# Patient Record
Sex: FEMALE | Race: WHITE | NOT HISPANIC OR LATINO | URBAN - METROPOLITAN AREA
[De-identification: names, ages, dates, MRNs, and addresses within clinical notes are randomized per-mention and may not be internally consistent; named-entity substitution may affect disease eponyms.]

---

## 2019-08-21 ENCOUNTER — OFFICE VISIT (OUTPATIENT)
Dept: URGENT CARE | Facility: MEDICAL CENTER | Age: 19
End: 2019-08-21
Payer: MEDICARE

## 2019-08-21 VITALS
OXYGEN SATURATION: 98 % | HEART RATE: 83 BPM | SYSTOLIC BLOOD PRESSURE: 110 MMHG | TEMPERATURE: 98.2 F | DIASTOLIC BLOOD PRESSURE: 76 MMHG | HEIGHT: 68 IN | RESPIRATION RATE: 18 BRPM | WEIGHT: 229 LBS | BODY MASS INDEX: 34.71 KG/M2

## 2019-08-21 DIAGNOSIS — R10.30 LOWER ABDOMINAL PAIN: Primary | ICD-10-CM

## 2019-08-21 LAB
SL AMB  POCT GLUCOSE, UA: ABNORMAL
SL AMB LEUKOCYTE ESTERASE,UA: ABNORMAL
SL AMB POCT BILIRUBIN,UA: ABNORMAL
SL AMB POCT BLOOD,UA: ABNORMAL
SL AMB POCT CLARITY,UA: ABNORMAL
SL AMB POCT COLOR,UA: YELLOW
SL AMB POCT KETONES,UA: 1.02
SL AMB POCT NITRITE,UA: ABNORMAL
SL AMB POCT PH,UA: 6
SL AMB POCT SPECIFIC GRAVITY,UA: 1
SL AMB POCT URINE HCG: NORMAL
SL AMB POCT URINE PROTEIN: ABNORMAL
SL AMB POCT UROBILINOGEN: 0.2

## 2019-08-21 PROCEDURE — 99213 OFFICE O/P EST LOW 20 MIN: CPT | Performed by: PHYSICIAN ASSISTANT

## 2019-08-21 PROCEDURE — 81002 URINALYSIS NONAUTO W/O SCOPE: CPT | Performed by: PHYSICIAN ASSISTANT

## 2019-08-21 PROCEDURE — 81025 URINE PREGNANCY TEST: CPT | Performed by: PHYSICIAN ASSISTANT

## 2019-08-21 RX ORDER — PROPRANOLOL HYDROCHLORIDE 80 MG/1
CAPSULE, EXTENDED RELEASE ORAL
COMMUNITY
Start: 2019-08-19 | End: 2019-09-20 | Stop reason: SDUPTHER

## 2019-08-21 RX ORDER — VENLAFAXINE HYDROCHLORIDE 75 MG/1
CAPSULE, EXTENDED RELEASE ORAL
COMMUNITY
Start: 2019-08-11

## 2019-08-21 NOTE — LETTER
August 21, 2019     Patient: April Torres   YOB: 2000   Date of Visit: 8/21/2019       To Whom it May Concern:    April Torres was seen in my clinic on 8/21/2019  She may be excused on 8/21/19 from work  If you have any questions or concerns, please don't hesitate to call           Sincerely,          Malik Combs PA-C        CC: No Recipients

## 2019-08-22 NOTE — PROGRESS NOTES
[facility]        NAME: Noe Erwin is a 19 y o  female  : 2000    MRN: [id number]  DATE: 2019  TIME: 8:44 PM    Assessment and Plan   Lower abdominal pain [R10 30]  1  Lower abdominal pain  POCT urine dip    POCT urine HCG    Urine culture      Patient currently on menstrual cycle and pain is described as crampy and in lower pelvic area  Likely dysmenorrhea, patient also has menorrhagia  Recommended follow-up with gyn if symptoms do not improve  Recommended NSAIDs for pain as well as heating pad  Pregnancy test negative, urine only showed small amount of leukocytes  Will send for culture to rule out a UTI as well  Patient Instructions   Please use Aleve or Motrin for lower abdominal cramping   may use heating pad on lower abdomen   follow-up with gyn if symptoms do not improve  Proceed to ER if symptoms worsen      Chief Complaint     Chief Complaint   Patient presents with    Abdominal Pain     Lower abdominal pain described as a constant stabbing pain beginning last evening  Left sided rib pain today   Rib Pain         History of Present Illness       Patient is a 18 y/o female who presents today with complaints of lower abdominal pain  This started last night and is sharp and crampy in nature  She has had her menstrual cycle for the past week and does have issues with dysmenorrhea  She does report nausea but denies any vomiting or diarrhea  She denies fevers  She has not really tried OTC NSAIDs as she feels these never worked for her in the past, she has tried CBD oil  Of note patient recently got Nexplanon put in back in 2019, a periods have been irregular since then  She also reports menorrhagia  She denies any frequency, urgency, dysuria  Review of Systems   Review of Systems   Constitutional: Negative for fever  Respiratory: Negative for shortness of breath  Cardiovascular: Negative for chest pain     Gastrointestinal: Positive for abdominal pain and nausea  Negative for diarrhea and vomiting  Genitourinary: Positive for pelvic pain  Negative for dysuria, frequency and urgency  Musculoskeletal: Positive for back pain  Current Medications       Current Outpatient Medications:     propranolol (INDERAL LA) 80 mg 24 hr capsule, , Disp: , Rfl:     venlafaxine (EFFEXOR-XR) 75 mg 24 hr capsule, , Disp: , Rfl:     Current Allergies     Allergies as of 08/21/2019    (No Known Allergies)            The following portions of the patient's history were reviewed and updated as appropriate: allergies, current medications, past family history, past medical history, past social history, past surgical history and problem list      Past Medical History:   Diagnosis Date    Anxiety     Depression        History reviewed  No pertinent surgical history  Family History   Problem Relation Age of Onset    Hypertension Mother     Hyperlipidemia Mother     Asthma Father     Hypertension Father          Medications have been verified  Objective   /76   Pulse 83   Temp 98 2 °F (36 8 °C) (Temporal)   Resp 18   Ht 5' 8" (1 727 m)   Wt 104 kg (229 lb)   LMP 08/15/2019 (Approximate)   SpO2 98%   BMI 34 82 kg/m²        Physical Exam     Physical Exam   Constitutional: She appears well-developed and well-nourished  No distress  Cardiovascular: Normal rate and regular rhythm  Pulmonary/Chest: Effort normal and breath sounds normal    Abdominal: Soft  Normal appearance and bowel sounds are normal  She exhibits no distension  There is tenderness in the suprapubic area  No RLQ pain or tenderness   Skin: Skin is warm and dry

## 2019-08-22 NOTE — PATIENT INSTRUCTIONS
Please use Aleve or Motrin for lower abdominal cramping   may use heating pad on lower abdomen   follow-up with gyn if symptoms do not improve  Proceed to ER if symptoms worsen    Dysmenorrhea   WHAT YOU NEED TO KNOW:   Dysmenorrhea is painful menstrual cramps at or around the time of your monthly period  DISCHARGE INSTRUCTIONS:   Medicines: You may need any of the following:  · NSAIDs  help decrease swelling, pain, and fever  This medicine is available with or without a doctor's order  NSAIDs can cause stomach bleeding or kidney problems in certain people  If you take blood thinner medicine, always ask your healthcare provider if NSAIDs are safe for you  Always read the medicine label and follow directions  · Birth control medicine  may help decrease your pain  This medicine may be birth control pills or an IUD that does not contain copper  · Take your medicine as directed  Contact your healthcare provider if you think your medicine is not helping or if you have side effects  Tell him if you are allergic to any medicine  Keep a list of the medicines, vitamins, and herbs you take  Include the amounts, and when and why you take them  Bring the list or the pill bottles to follow-up visits  Carry your medicine list with you in case of an emergency  Eat low-fat foods: Increase the amount of vegetables and raw seeds you eat  Ask your healthcare provider if you should take vitamin B or magnesium supplements  These will help decrease your pain  Do not eat dairy products or eggs  Apply heat:  Apply heat on your lower abdomen for 20 to 30 minutes every 2 hours for as many days as directed  Heat helps decrease pain and muscle spasms  Manage your stress:  Stress can make your symptoms worse  Try relaxation exercises, such as deep breathing  Exercise regularly:  Ask your healthcare provider about the best exercise plan for you  Exercise can help decrease pain     Keep a record of your pain:  Write down when your pain and periods start and stop  Bring the record with you to your follow-up visits  Do not smoke:  Avoid others who smoke  If you smoke, it is never too late to quit  Smoking can increase your risk for dysmenorrhea  Ask your healthcare provider for information if you need help quitting  Follow up with your healthcare provider or OB-GYN as directed:  Write down your questions so you remember to ask them during your visits  Contact your healthcare provider or OB-GYN if:   · You have anxiety or feel depressed  · Your periods are early, late, or more painful than usual     · You have questions or concerns about your condition or care  Return to the emergency department if:   · You have severe pain  · You have heavy vaginal bleeding and you feel faint  · You have sudden chest pain and trouble breathing  © 2017 [address] Information is for End User's use only and may not be sold, redistributed or otherwise used for commercial purposes  All illustrations and images included in CareNotes® are the copyrighted property of A D A M [employer] Inc  or [employer]  The above information is an  only  It is not intended as medical advice for individual conditions or treatments  Talk to your doctor, nurse or pharmacist before following any medical regimen to see if it is safe and effective for you

## 2019-08-24 LAB
BACTERIA UR CULT: NORMAL
Lab: NO GROWTH

## 2019-09-20 ENCOUNTER — OFFICE VISIT (OUTPATIENT)
Dept: FAMILY MEDICINE CLINIC | Facility: MEDICAL CENTER | Age: 19
End: 2019-09-20
Payer: MEDICARE

## 2019-09-20 VITALS
DIASTOLIC BLOOD PRESSURE: 84 MMHG | SYSTOLIC BLOOD PRESSURE: 138 MMHG | WEIGHT: 233 LBS | HEART RATE: 86 BPM | BODY MASS INDEX: 35.31 KG/M2 | HEIGHT: 68 IN | RESPIRATION RATE: 16 BRPM

## 2019-09-20 DIAGNOSIS — Z23 ENCOUNTER FOR IMMUNIZATION: ICD-10-CM

## 2019-09-20 DIAGNOSIS — F32.A DEPRESSION, UNSPECIFIED DEPRESSION TYPE: ICD-10-CM

## 2019-09-20 DIAGNOSIS — Z13.1 SCREENING FOR DIABETES MELLITUS: ICD-10-CM

## 2019-09-20 DIAGNOSIS — G43.909 MIGRAINE WITHOUT STATUS MIGRAINOSUS, NOT INTRACTABLE, UNSPECIFIED MIGRAINE TYPE: Primary | ICD-10-CM

## 2019-09-20 DIAGNOSIS — Z13.220 SCREENING FOR LIPID DISORDERS: ICD-10-CM

## 2019-09-20 DIAGNOSIS — M51.26 DISC DISPLACEMENT, LUMBAR: ICD-10-CM

## 2019-09-20 DIAGNOSIS — Z12.4 SCREENING FOR CERVICAL CANCER: ICD-10-CM

## 2019-09-20 DIAGNOSIS — Z13.29 SCREENING FOR THYROID DISORDER: ICD-10-CM

## 2019-09-20 DIAGNOSIS — J45.990 EXERCISE-INDUCED BRONCHOSPASM: ICD-10-CM

## 2019-09-20 DIAGNOSIS — F41.9 ANXIETY: ICD-10-CM

## 2019-09-20 PROBLEM — N94.6 DYSMENORRHEA: Status: ACTIVE | Noted: 2018-10-09

## 2019-09-20 PROCEDURE — 90471 IMMUNIZATION ADMIN: CPT

## 2019-09-20 PROCEDURE — 3008F BODY MASS INDEX DOCD: CPT | Performed by: FAMILY MEDICINE

## 2019-09-20 PROCEDURE — 99204 OFFICE O/P NEW MOD 45 MIN: CPT | Performed by: FAMILY MEDICINE

## 2019-09-20 PROCEDURE — 90651 9VHPV VACCINE 2/3 DOSE IM: CPT

## 2019-09-20 RX ORDER — PROPRANOLOL HYDROCHLORIDE 80 MG/1
80 CAPSULE, EXTENDED RELEASE ORAL DAILY
Qty: 90 CAPSULE | Refills: 1 | Status: SHIPPED | OUTPATIENT
Start: 2019-09-20

## 2019-09-20 RX ORDER — ALBUTEROL SULFATE 90 UG/1
AEROSOL, METERED RESPIRATORY (INHALATION)
COMMUNITY
Start: 2019-04-24

## 2019-09-20 RX ORDER — SUMATRIPTAN 100 MG/1
TABLET, FILM COATED ORAL
COMMUNITY
Start: 2019-04-10 | End: 2019-09-24 | Stop reason: SDUPTHER

## 2019-09-20 RX ORDER — KETOROLAC TROMETHAMINE 10 MG/1
TABLET, FILM COATED ORAL
COMMUNITY
Start: 2019-08-23 | End: 2019-09-20

## 2019-09-20 RX ORDER — ONDANSETRON 4 MG/1
TABLET, FILM COATED ORAL
COMMUNITY
Start: 2019-08-23

## 2019-09-20 NOTE — PROGRESS NOTES
Assessment/Plan:    No problem-specific Assessment & Plan notes found for this encounter  Diagnoses and all orders for this visit:    Migraine without status migrainosus, not intractable, unspecified migraine type  -     propranolol (INDERAL LA) 80 mg 24 hr capsule; Take 1 capsule (80 mg total) by mouth daily  Stable  Continue propranolol daily  Continue as needed Imitrex and Zofran  Depression, unspecified depression type  -     Ambulatory referral to [facility]; Future  Appears to be stable  No suicidal ideation  Continue Effexor  Anxiety  -     Ambulatory referral to [facility]; Future  Appears to be stable  Continue Effexor  Avoid benzodiazepines at this time  Exercise-induced bronchospasm  Well controlled  Continue albuterol as needed  Disc displacement, lumbar  Likely secondary to obesity  Diet and exercise highly encouraged  Note provided for patient to be able to wear sneakers at work  Hold off on pain management referral at this time  BMI Counseling: Body mass index is 35 43 kg/m²  The BMI is above normal  Nutrition recommendations include decreasing overall calorie intake  Exercise recommendations include moderate aerobic physical activity for 150 minutes/week  Encounter for immunization  -     HPV VACCINE 9 VALENT IM  Patient is over due for her HPV vaccine  She is agreeable to the vaccine  Vaccine given and tolerated well  Flu vaccine recommended as well but patient declined  Screening for cervical cancer  -     Ambulatory referral to Gynecology; Future  Referral to gyn for cervical cancer screening and due to patient having a Nexplanon  Screening for lipid disorders  -     Lipid Panel with Direct LDL reflex; Future  -     Lipid Panel with Direct LDL reflex  Screening for diabetes mellitus  -     Comprehensive metabolic panel;  Future  -     Hemoglobin A1C; Future  -     Comprehensive metabolic panel  -     Hemoglobin A1C  Screening for thyroid disorder  -     TSH, 3rd generation with Free T4 reflex; Future  -     TSH, 3rd generation with Free T4 reflex  Check labs to screen for lipid disorders, diabetes and thyroid disorder  Other orders  -     ondansetron (ZOFRAN) 4 mg tablet; take 1 tablet by mouth every 8 hours if needed for nausea  -     Discontinue: ketorolac (TORADOL) 10 mg tablet; take 1 tablet by mouth every 6 hours if needed for pain  -     etonogestrel (NEXPLANON) subdermal implant; To be placed at our office   -     albuterol (VENTOLIN HFA) 90 mcg/act inhaler; 1-2 puffs q6 hrs PRN  -     SUMAtriptan (IMITREX) 100 mg tablet; TAKE ONE TABLET BY MOUTH ONCE DAILY AS NEEDED MAY  REPEAT IN 2  HOURS  X1    Follow-up in six months for physical exam or sooner if needed  Subjective:      Patient ID: Arianne San is a 22 y o  female  Patient presents to [facility] care  She recently moved to the area from [place]  She has migraines  She is currently on propranolol 80 mg daily for prophylaxis  Tolerating medication well without any lightheadedness or dizziness  Does need medication to be refilled  She also uses Zofran as needed for nausea when she gets the migraines and Imitrex as needed when she feels a migraine starting  Both medications work very well but a refill is not requested at this time  She has depression  She is currently on Effexor 75 mg daily  Does have some stress recently secondary to her move which has exacerbate her depression somewhat but is still well controlled  Denies suicidal ideation  Does not need a refill at this time  She has anxiety  She is currently on Effexor 75 mg daily for this as well  Stress from recent move has exacerbate her anxiety but she is controlling it well  She was on lorazepam as well on a p r n  basis but has not used that in a few months  She has exercise-induced bronchospasm  She uses an albuterol inhaler prior to physical activity    Medication works very well to keep her symptoms controlled  She would like a work note so she may wear sneakers at work as she has spinal stenosis and not wearing supportive shoes is very painful on her back  She was supposed to see pain management in [place] but never got the appointment due to moving  She is managing her back pain well currently  The following portions of the patient's history were reviewed and updated as appropriate:   She  has a past medical history of Anxiety, Depression, Exercise induced bronchospasm, and Migraines  She   Patient Active Problem List    Diagnosis Date Noted    Exercise-induced bronchospasm 09/20/2019    Anxiety 09/20/2019    Depression 08/05/2019    Disc displacement, lumbar 08/05/2019    Dysmenorrhea 10/09/2018    Dyspepsia 11/12/2015    Migraine headache 10/29/2009     She  has a past surgical history that includes Mouth surgery  Her family history includes Asthma in her father; Hyperlipidemia in her mother; Hypertension in her father and mother  She  reports that she has never smoked  She has never used smokeless tobacco  She reports that she drinks alcohol  She reports that she does not use drugs  Current Outpatient Medications   Medication Sig Dispense Refill    albuterol (VENTOLIN HFA) 90 mcg/act inhaler 1-2 puffs q6 hrs PRN      etonogestrel (NEXPLANON) subdermal implant To be placed at our office   ondansetron (ZOFRAN) 4 mg tablet take 1 tablet by mouth every 8 hours if needed for nausea      propranolol (INDERAL LA) 80 mg 24 hr capsule Take 1 capsule (80 mg total) by mouth daily 90 capsule 1    SUMAtriptan (IMITREX) 100 mg tablet TAKE ONE TABLET BY MOUTH ONCE DAILY AS NEEDED MAY  REPEAT IN 2  HOURS  X1      venlafaxine (EFFEXOR-XR) 75 mg 24 hr capsule        No current facility-administered medications for this visit        Current Outpatient Medications on File Prior to Visit   Medication Sig    albuterol (VENTOLIN HFA) 90 mcg/act inhaler 1-2 puffs q6 hrs PRN    etonogestrel (Joyce Chu) subdermal implant To be placed at our office   ondansetron (ZOFRAN) 4 mg tablet take 1 tablet by mouth every 8 hours if needed for nausea    SUMAtriptan (IMITREX) 100 mg tablet TAKE ONE TABLET BY MOUTH ONCE DAILY AS NEEDED MAY  REPEAT IN 2  HOURS  X1    venlafaxine (EFFEXOR-XR) 75 mg 24 hr capsule     [DISCONTINUED] ketorolac (TORADOL) 10 mg tablet take 1 tablet by mouth every 6 hours if needed for pain    [DISCONTINUED] propranolol (INDERAL LA) 80 mg 24 hr capsule      No current facility-administered medications on file prior to visit  She has No Known Allergies       Review of Systems   Constitutional: Negative for fever  HENT: Negative for ear pain, rhinorrhea and sore throat  Eyes: Negative for visual disturbance  Respiratory: Negative for shortness of breath  Cardiovascular: Negative for chest pain  Gastrointestinal: Negative for abdominal pain and blood in stool  Genitourinary: Negative for dysuria and hematuria  Musculoskeletal: Negative for arthralgias and myalgias  Skin: Negative for rash  Neurological: Negative for headaches  Objective:      /84 (BP Location: Left arm, Patient Position: Sitting, Cuff Size: Large)   Pulse 86   Resp 16   Ht 5' 8" (1 727 m)   Wt 106 kg (233 lb)   BMI 35 43 kg/m²          Physical Exam   Constitutional: She is oriented to person, place, and time  Vital signs are normal  She appears well-developed and well-nourished  HENT:   Head: Normocephalic and atraumatic  Right Ear: Tympanic membrane, external ear and ear canal normal    Left Ear: Tympanic membrane, external ear and ear canal normal    Nose: Nose normal    Mouth/Throat: Uvula is midline, oropharynx is clear and moist and mucous membranes are normal    Eyes: Pupils are equal, round, and reactive to light  Conjunctivae, EOM and lids are normal    Neck: Trachea normal  Neck supple  No thyromegaly present     Cardiovascular: Normal rate, regular rhythm, S1 normal and S2 normal    No murmur heard  Pulmonary/Chest: Effort normal and breath sounds normal    Abdominal: Soft  Bowel sounds are normal  There is no tenderness  Lymphadenopathy:     She has no cervical adenopathy  Neurological: She is alert and oriented to person, place, and time  No cranial nerve deficit  Skin: Skin is warm, dry and intact  Psychiatric: She has a normal mood and affect   Her speech is normal and behavior is normal  Thought content normal

## 2019-09-20 NOTE — LETTER
September 20, 2019     Patient: Eleni Scott   YOB: 2000   Date of Visit: 9/20/2019       To Whom it May Concern:    Eleni Scott is under my professional care  She was seen in my office on 9/20/2019  She is to be allowed to wear sneakers at work  If you have any questions or concerns, please don't hesitate to call           Sincerely,          Bernardino Lucero, DO

## 2019-09-23 ENCOUNTER — OFFICE VISIT (OUTPATIENT)
Dept: URGENT CARE | Facility: MEDICAL CENTER | Age: 19
End: 2019-09-23
Payer: MEDICARE

## 2019-09-23 VITALS
HEIGHT: 68 IN | DIASTOLIC BLOOD PRESSURE: 78 MMHG | BODY MASS INDEX: 35.46 KG/M2 | SYSTOLIC BLOOD PRESSURE: 110 MMHG | RESPIRATION RATE: 18 BRPM | OXYGEN SATURATION: 99 % | HEART RATE: 88 BPM | WEIGHT: 234 LBS | TEMPERATURE: 98.2 F

## 2019-09-23 DIAGNOSIS — G43.909 MIGRAINE WITHOUT STATUS MIGRAINOSUS, NOT INTRACTABLE, UNSPECIFIED MIGRAINE TYPE: Primary | ICD-10-CM

## 2019-09-23 PROCEDURE — 99213 OFFICE O/P EST LOW 20 MIN: CPT | Performed by: PHYSICIAN ASSISTANT

## 2019-09-23 RX ORDER — BUTALBITAL, ACETAMINOPHEN AND CAFFEINE 50; 325; 40 MG/1; MG/1; MG/1
1 TABLET ORAL EVERY 4 HOURS PRN
Qty: 10 TABLET | Refills: 0 | Status: SHIPPED | OUTPATIENT
Start: 2019-09-23

## 2019-09-23 NOTE — LETTER
September 23, 2019     Patient: Alonzo Rosado   YOB: 2000   Date of Visit: 9/23/2019       To Whom it May Concern:    Alonzo Rosado was seen in my clinic on 9/23/2019  She may be excused from work today  If you have any questions or concerns, please don't hesitate to call           Sincerely,          Roya Johnston PA-C        CC: No Recipients

## 2019-09-23 NOTE — PROGRESS NOTES
[facility]        NAME: Cleo Valdes is a 22 y o  female  : 2000    MRN: [id number]  DATE: 2019  TIME: 6:13 PM    Assessment and Plan   Migraine without status migrainosus, not intractable, unspecified migraine type [G43 909]  1  Migraine without status migrainosus, not intractable, unspecified migraine type  butalbital-acetaminophen-caffeine (FIORICET,ESGIC) -40 mg per tablet       Neuro exam within normal limits  No abnormal findings on exam   Will give Fioricet for relief  Recommended following up with PCP for refill of Imitrex  Also deferred to PCP for further management of migraines  Patient Instructions       Please take Fioricet as needed   call PCP and ask for refill of Imitrex  Continue propranolol   return to PCP if symptoms worsen or persist      Chief Complaint     Chief Complaint   Patient presents with    Migraine     c/o migraine and nausea x 3 days  History of Present Illness        Patient is a 25-year-old female who presents today with migraine headache  This has been persistent intermittently for the past 3 days  Ibuprofen will help for a little while and then the headache will return  She does have associated nausea and vomiting  Patient has history of migraines, usually at least once per month and these are similar symptoms for her usual migraines  Not the worst headache of her life and pain is 4/10  She had been off her propranolol and Imitrex for a while, she just started the propranolol again last week  She did take Zofran which did not help the nausea much  She denies any visual disturbances  Review of Systems   Review of Systems   Constitutional: Negative for fever  HENT: Negative for congestion, ear pain, sinus pain and sore throat  Eyes: Negative for photophobia and visual disturbance  Respiratory: Negative for cough and shortness of breath  Cardiovascular: Negative for chest pain     Gastrointestinal: Positive for nausea and vomiting  Negative for abdominal pain  Skin: Negative for color change  Neurological: Positive for headaches  Negative for dizziness, speech difficulty, weakness and light-headedness  Current Medications       Current Outpatient Medications:     albuterol (VENTOLIN HFA) 90 mcg/act inhaler, 1-2 puffs q6 hrs PRN, Disp: , Rfl:     etonogestrel (NEXPLANON) subdermal implant, To be placed at our office  , Disp: , Rfl:     ondansetron (ZOFRAN) 4 mg tablet, take 1 tablet by mouth every 8 hours if needed for nausea, Disp: , Rfl:     propranolol (INDERAL LA) 80 mg 24 hr capsule, Take 1 capsule (80 mg total) by mouth daily, Disp: 90 capsule, Rfl: 1    venlafaxine (EFFEXOR-XR) 75 mg 24 hr capsule, , Disp: , Rfl:     butalbital-acetaminophen-caffeine (FIORICET,ESGIC) -40 mg per tablet, Take 1 tablet by mouth every 4 (four) hours as needed for headaches, Disp: 10 tablet, Rfl: 0    SUMAtriptan (IMITREX) 100 mg tablet, TAKE ONE TABLET BY MOUTH ONCE DAILY AS NEEDED MAY  REPEAT IN 2  HOURS  X1, Disp: , Rfl:     Current Allergies     Allergies as of 09/23/2019    (No Known Allergies)            The following portions of the patient's history were reviewed and updated as appropriate: allergies, current medications, past family history, past medical history, past social history, past surgical history and problem list      Past Medical History:   Diagnosis Date    Anxiety     Depression     Exercise induced bronchospasm     Migraines        Past Surgical History:   Procedure Laterality Date    MOUTH SURGERY         Family History   Problem Relation Age of Onset    Hypertension Mother     Hyperlipidemia Mother     Asthma Father     Hypertension Father          Medications have been verified          Objective   /78   Pulse 88   Temp 98 2 °F (36 8 °C) (Temporal)   Resp 18   Ht 5' 8" (1 727 m)   Wt 106 kg (234 lb)   SpO2 99%   BMI 35 58 kg/m²        Physical Exam     Physical Exam   Constitutional: She is oriented to person, place, and time  She appears well-developed and well-nourished  HENT:   Head: Normocephalic and atraumatic  Right Ear: Tympanic membrane and ear canal normal    Left Ear: Tympanic membrane and ear canal normal    Nose: Nose normal    Mouth/Throat: Uvula is midline, oropharynx is clear and moist and mucous membranes are normal    Eyes: Pupils are equal, round, and reactive to light  Conjunctivae and EOM are normal    Neck: Normal range of motion  Neck supple  Cardiovascular: Normal rate and regular rhythm  Pulmonary/Chest: Effort normal and breath sounds normal    Abdominal: Soft  Bowel sounds are normal    Lymphadenopathy:     She has no cervical adenopathy  Neurological: She is alert and oriented to person, place, and time  She has normal strength  No cranial nerve deficit or sensory deficit  She displays a negative Romberg sign  Coordination and gait normal  GCS eye subscore is 4  GCS verbal subscore is 5  GCS motor subscore is 6  Skin: Skin is warm and dry

## 2019-09-23 NOTE — PATIENT INSTRUCTIONS
Please take Fioricet as needed   call PCP and ask for refill of Imitrex  Continue propranolol   return to PCP if symptoms worsen or persist    Migraine Headache   WHAT YOU NEED TO KNOW:   A migraine is a severe headache  The pain can be so severe that it interferes with your daily activities  A migraine can last a few hours up to several days  The exact cause of migraines is not known  DISCHARGE INSTRUCTIONS:   Return to the emergency department if:   · You have a headache that seems different or much worse than your usual migraine headache  · You have a severe headache with a fever or a stiff neck  · You have new problems with speech, vision, balance, or movement  · You feel like you are going to faint, you become confused, or you have a seizure  Contact your healthcare provider or neurologist if:   · Your migraines interfere with your daily activities  · Your medicines or treatments stop working  · You have questions or concerns about your condition or care  Medicines: You may need any of the following  Take medicine as soon as you feel a migraine begin  · Prescription pain medicine  may be given  Do not wait until the pain is severe before you take your medicine  · Migraine medicines  are used to help prevent a migraine or stop it once it starts  · Antinausea medicine  may be given to calm your stomach and to help prevent vomiting  This medicine can also help relieve pain  · Take your medicine as directed  Contact your healthcare provider if you think your medicine is not helping or if you have side effects  Tell him or her if you are allergic to any medicine  Keep a list of the medicines, vitamins, and herbs you take  Include the amounts, and when and why you take them  Bring the list or the pill bottles to follow-up visits  Carry your medicine list with you in case of an emergency  Manage your symptoms:   · Rest in a dark, quiet room  This will help decrease your pain  Sleep may also help relieve the pain  · Apply ice to decrease pain  Use an ice pack, or put crushed ice in a plastic bag  Cover the ice pack with a towel and place it on your head  Apply ice for 15 to 20 minutes every hour  · Apply heat to decrease pain and muscle spasms  Use a small towel dampened with warm water or a heating pad, or sit in a warm bath  Apply heat on the area for 20 to 30 minutes every 2 hours  You may alternate heat and ice  · Keep a migraine record  Write down when your migraines start and stop  Include your symptoms and what you were doing when a migraine began  Record what you ate or drank for 24 hours before the migraine started  Keep track of what you did to treat your migraine and if it worked  Bring the migraine record with you to visits with your healthcare provider  Follow up with your healthcare provider or neurologist as directed:  Bring your migraine record with you  Write down your questions so you remember to ask them during your visits  Prevent another migraine:   · Do not smoke  Nicotine and other chemicals in cigarettes and cigars can trigger a migraine or make it worse  Ask your healthcare provider for information if you currently smoke and need help to quit  E-cigarettes or smokeless tobacco still contain nicotine  Talk to your healthcare provider before you use these products  · Do not drink alcohol  Alcohol can trigger a migraine  It can also keep medicines used to treat your migraines from working  · Get regular exercise  Exercise may help prevent migraines  Talk to your healthcare provider about the best exercise plan for you  Try to get at least 30 minutes of exercise on most days  · Manage stress  Stress may trigger a migraine  Learn new ways to relax, such as deep breathing  · Create a sleep schedule  Go to bed and get up at the same times each day  Do not watch television before bed  · Eat regular meals    Include healthy foods such as include fruit, vegetables, whole-grain breads, low-fat dairy products, beans, lean meat, and fish  Do not have food or drinks that trigger your migraines  © 2017 [employer] Information is for End User's use only and may not be sold, redistributed or otherwise used for commercial purposes  All illustrations and images included in CareNotes® are the copyrighted property of [employer]  or [employer]  The above information is an  only  It is not intended as medical advice for individual conditions or treatments  Talk to your doctor, nurse or pharmacist before following any medical regimen to see if it is safe and effective for you

## 2019-09-24 DIAGNOSIS — G43.909 MIGRAINE WITHOUT STATUS MIGRAINOSUS, NOT INTRACTABLE, UNSPECIFIED MIGRAINE TYPE: Primary | ICD-10-CM

## 2019-09-24 PROBLEM — E78.1 HYPERTRIGLYCERIDEMIA WITHOUT HYPERCHOLESTEROLEMIA: Status: ACTIVE | Noted: 2019-09-24

## 2019-09-24 LAB
ALBUMIN SERPL-MCNC: 4.5 G/DL (ref 3.5–5.5)
ALBUMIN/GLOB SERPL: 1.7 {RATIO} (ref 1.2–2.2)
ALP SERPL-CCNC: 47 IU/L (ref 39–117)
ALT SERPL-CCNC: 12 IU/L (ref 0–32)
AST SERPL-CCNC: 13 IU/L (ref 0–40)
BILIRUB SERPL-MCNC: 0.7 MG/DL (ref 0–1.2)
BUN SERPL-MCNC: 14 MG/DL (ref 6–20)
BUN/CREAT SERPL: 19 (ref 9–23)
CALCIUM SERPL-MCNC: 9.4 MG/DL (ref 8.7–10.2)
CHLORIDE SERPL-SCNC: 103 MMOL/L (ref 96–106)
CHOLEST SERPL-MCNC: 165 MG/DL (ref 100–199)
CO2 SERPL-SCNC: 20 MMOL/L (ref 20–29)
CREAT SERPL-MCNC: 0.72 MG/DL (ref 0.57–1)
EST. AVERAGE GLUCOSE BLD GHB EST-MCNC: 94 MG/DL
GLOBULIN SER-MCNC: 2.6 G/DL (ref 1.5–4.5)
GLUCOSE SERPL-MCNC: 86 MG/DL (ref 65–99)
HBA1C MFR BLD: 4.9 % (ref 4.8–5.6)
HDLC SERPL-MCNC: 43 MG/DL
LDLC SERPL CALC-MCNC: 83 MG/DL (ref 0–99)
LDLC/HDLC SERPL: 1.9 RATIO (ref 0–3.2)
POTASSIUM SERPL-SCNC: 4.8 MMOL/L (ref 3.5–5.2)
PROT SERPL-MCNC: 7.1 G/DL (ref 6–8.5)
SL AMB EGFR AFRICAN AMERICAN: 138 ML/MIN/1.73
SL AMB EGFR NON AFRICAN AMERICAN: 120 ML/MIN/1.73
SL AMB VLDL CHOLESTEROL CALC: 39 MG/DL (ref 5–40)
SODIUM SERPL-SCNC: 138 MMOL/L (ref 134–144)
TRIGL SERPL-MCNC: 193 MG/DL (ref 0–149)
TSH SERPL DL<=0.005 MIU/L-ACNC: 2.03 UIU/ML (ref 0.45–4.5)

## 2019-09-24 RX ORDER — SUMATRIPTAN 100 MG/1
100 TABLET, FILM COATED ORAL ONCE AS NEEDED
Qty: 9 TABLET | Refills: 1 | Status: SHIPPED | OUTPATIENT
Start: 2019-09-24

## 2019-11-04 ENCOUNTER — OFFICE VISIT (OUTPATIENT)
Dept: FAMILY MEDICINE CLINIC | Facility: MEDICAL CENTER | Age: 19
End: 2019-11-04
Payer: MEDICARE

## 2019-11-04 VITALS
WEIGHT: 237 LBS | SYSTOLIC BLOOD PRESSURE: 118 MMHG | DIASTOLIC BLOOD PRESSURE: 78 MMHG | OXYGEN SATURATION: 98 % | HEART RATE: 78 BPM | BODY MASS INDEX: 36.04 KG/M2

## 2019-11-04 DIAGNOSIS — Z11.59 NEED FOR HEPATITIS C SCREENING TEST: ICD-10-CM

## 2019-11-04 DIAGNOSIS — G47.00 INSOMNIA, UNSPECIFIED TYPE: Primary | ICD-10-CM

## 2019-11-04 DIAGNOSIS — F32.A DEPRESSION, UNSPECIFIED DEPRESSION TYPE: ICD-10-CM

## 2019-11-04 DIAGNOSIS — Z11.4 SCREENING FOR HIV (HUMAN IMMUNODEFICIENCY VIRUS): ICD-10-CM

## 2019-11-04 PROCEDURE — 99213 OFFICE O/P EST LOW 20 MIN: CPT | Performed by: FAMILY MEDICINE

## 2019-11-04 NOTE — PROGRESS NOTES
Assessment/Plan:    No problem-specific Assessment & Plan notes found for this encounter  Diagnoses and all orders for this visit:    Insomnia, unspecified type  -     Cancel: Ambulatory referral to [facility]; Future  -     Ambulatory referral to Sleep Medicine; Future  Etiology unclear  Referral to the sleep specialist to assess need for a sleep study  Depression, unspecified depression type  -     Cancel: Ambulatory referral to Sleep Medicine; Future  -     Ambulatory referral to [facility]; Future  Referral to [facility] for therapy  Depression otherwise appears to be well controlled  Need for hepatitis C screening test  -     Hepatitis C antibody; Future  -     Hepatitis C antibody  Screening for HIV (human immunodeficiency virus)  -     HIV 1/2 AG-AB combo; Future  Patient did give consent for hepatitis C and HIV screening  Flu vaccine highly encouraged but patient declined  Follow-up as previously scheduled in September of 2020 or sooner if needed  Subjective:      Patient ID: Mike Rojas is a 22 y o  female  Patient presents with a chief complaint of insomnia  She states she can fall asleep but has trouble remaining asleep  Has been going on for few months  Patient does grind her teeth at night  She does not believe she snores  She does not believe she stops breathing when she sleeps  No excess caffeine intake  Limited screen time prior to going to bed  Has nightmares as well  She would also like a referral for counseling due to her depression  Denies suicidal ideation  The following portions of the patient's history were reviewed and updated as appropriate:   She  has a past medical history of Anxiety, Depression, Exercise induced bronchospasm, and Migraines    She   Patient Active Problem List    Diagnosis Date Noted    Hypertriglyceridemia without hypercholesterolemia 09/24/2019    Exercise-induced bronchospasm 09/20/2019    Anxiety 09/20/2019    Depression 08/05/2019    Disc displacement, lumbar 08/05/2019    Dysmenorrhea 10/09/2018    Dyspepsia 11/12/2015    Migraine headache 10/29/2009     She  has a past surgical history that includes Mouth surgery  Her family history includes Asthma in her father; Hyperlipidemia in her mother; Hypertension in her father and mother  She  reports that she has never smoked  She has never used smokeless tobacco  She reports that she drinks alcohol  She reports that she does not use drugs  Current Outpatient Medications   Medication Sig Dispense Refill    albuterol (VENTOLIN HFA) 90 mcg/act inhaler 1-2 puffs q6 hrs PRN      butalbital-acetaminophen-caffeine (FIORICET,ESGIC) -40 mg per tablet Take 1 tablet by mouth every 4 (four) hours as needed for headaches 10 tablet 0    etonogestrel (NEXPLANON) subdermal implant To be placed at our office   ondansetron (ZOFRAN) 4 mg tablet take 1 tablet by mouth every 8 hours if needed for nausea      propranolol (INDERAL LA) 80 mg 24 hr capsule Take 1 capsule (80 mg total) by mouth daily 90 capsule 1    SUMAtriptan (IMITREX) 100 mg tablet Take 1 tablet (100 mg total) by mouth once as needed for migraine for up to 1 dose 9 tablet 1    venlafaxine (EFFEXOR-XR) 75 mg 24 hr capsule        No current facility-administered medications for this visit  Current Outpatient Medications on File Prior to Visit   Medication Sig    albuterol (VENTOLIN HFA) 90 mcg/act inhaler 1-2 puffs q6 hrs PRN    butalbital-acetaminophen-caffeine (FIORICET,ESGIC) -40 mg per tablet Take 1 tablet by mouth every 4 (four) hours as needed for headaches    etonogestrel (NEXPLANON) subdermal implant To be placed at our office      ondansetron (ZOFRAN) 4 mg tablet take 1 tablet by mouth every 8 hours if needed for nausea    propranolol (INDERAL LA) 80 mg 24 hr capsule Take 1 capsule (80 mg total) by mouth daily    SUMAtriptan (IMITREX) 100 mg tablet Take 1 tablet (100 mg total) by mouth once as needed for migraine for up to 1 dose    venlafaxine (EFFEXOR-XR) 75 mg 24 hr capsule      No current facility-administered medications on file prior to visit  She has No Known Allergies       Review of Systems   Constitutional: Negative for fever  Respiratory: Negative for shortness of breath  Cardiovascular: Negative for chest pain  Neurological: Negative for headaches  Objective:      /78 (BP Location: Left arm, Patient Position: Sitting, Cuff Size: Adult)   Pulse 78   Wt 108 kg (237 lb)   SpO2 98%   BMI 36 04 kg/m²          Physical Exam   Constitutional: Vital signs are normal  She appears well-developed and well-nourished  HENT:   Head: Normocephalic and atraumatic  Right Ear: Tympanic membrane, external ear and ear canal normal    Left Ear: Tympanic membrane, external ear and ear canal normal    Nose: No mucosal edema or rhinorrhea  Mouth/Throat: Uvula is midline, oropharynx is clear and moist and mucous membranes are normal    Eyes: Pupils are equal, round, and reactive to light  Conjunctivae, EOM and lids are normal    Neck: Trachea normal  Neck supple  Cardiovascular: Normal rate, regular rhythm and normal heart sounds  No murmur heard  Pulmonary/Chest: Effort normal and breath sounds normal    Lymphadenopathy:     She has no cervical adenopathy

## 2019-11-08 ENCOUNTER — OFFICE VISIT (OUTPATIENT)
Dept: OBGYN CLINIC | Facility: MEDICAL CENTER | Age: 19
End: 2019-11-08
Payer: MEDICARE

## 2019-11-08 VITALS
SYSTOLIC BLOOD PRESSURE: 108 MMHG | WEIGHT: 233.4 LBS | HEIGHT: 69 IN | BODY MASS INDEX: 34.57 KG/M2 | DIASTOLIC BLOOD PRESSURE: 78 MMHG

## 2019-11-08 DIAGNOSIS — F52.0 HYPOACTIVE SEXUAL DESIRE: ICD-10-CM

## 2019-11-08 DIAGNOSIS — Z11.3 SCREENING FOR STDS (SEXUALLY TRANSMITTED DISEASES): ICD-10-CM

## 2019-11-08 DIAGNOSIS — Z01.419 ENCOUNTER FOR GYNECOLOGICAL EXAMINATION WITHOUT ABNORMAL FINDING: Primary | ICD-10-CM

## 2019-11-08 PROCEDURE — G0145 SCR C/V CYTO,THINLAYER,RESCR: HCPCS | Performed by: STUDENT IN AN ORGANIZED HEALTH CARE EDUCATION/TRAINING PROGRAM

## 2019-11-08 PROCEDURE — 99385 PREV VISIT NEW AGE 18-39: CPT | Performed by: STUDENT IN AN ORGANIZED HEALTH CARE EDUCATION/TRAINING PROGRAM

## 2019-11-08 NOTE — ASSESSMENT & PLAN NOTE
Patient reports sex has never been pleasurable for her her  She describes the sensation as just a lot of friction  Has no arousal with foreplay  She is hopeful to change this for both herself and her partner  Reviewed foreplay to help with arousal  Discussed water based lubricants, thicker lubricant may last longer during intercourse, also encouraged experimentation with different condoms to try to achieve improved comfort  Referral to sexual health counseling with Radha Velazquez

## 2019-11-08 NOTE — PROGRESS NOTES
Assessment/Plan:    Encounter for gynecological examination without abnormal finding  23 yo G0 here for new patient annual     Pap collected today with HPV if ASCUS  Receiving HPV vaccines with PCP, next due around 11/20/19, last 3/20/20  Reviewed breast self awareness  Encouraged healthy weight maintenance with diet and exercise  Nexplanon in place for contraception and improvement in dysmenorrhea  Due to come out 3/6/2022  Sexually active  Agreeable to comprehensive STI screening  Discuss condom use for STI prevention  Hypoactive sexual desire  Patient reports sex has never been pleasurable for her her  She describes the sensation as just a lot of friction  Has no arousal with foreplay  She is hopeful to change this for both herself and her partner  Reviewed foreplay to help with arousal  Discussed water based lubricants, thicker lubricant may last longer during intercourse, also encouraged experimentation with different condoms to try to achieve improved comfort  Referral to sexual health counseling with Deisy Santana and PFPT  Diagnoses and all orders for this visit:    Encounter for gynecological examination without abnormal finding  -     Liquid-based pap, screening    Screening for STDs (sexually transmitted diseases)  -     Hepatitis B surface antigen; Future  -     RPR  -     Chlamydia/GC amplified DNA by PCR  -     Hepatitis B surface antigen    Hypoactive sexual desire  -     Ambulatory referral to Gynecology; Future  -     Ambulatory referral to Physical Therapy; Future    Other orders  -     Cancel: Liquid-based pap, screening          Subjective:      Patient ID: Olga Heath is a 22 y o  female  23 yo G0 here for annual new patient exam  She is overall feeling well  She had a Nexplanon placed in March of this year for contraception but this has also made flow and cramping much better  She cycles irregularly and has occasional break through bleeding  She is sexually active  She reports concerns with low sexual desire, no arousal and no pleasure with intercourse  It isn't necessarily painful but she doesn't enjoy it  She has been with her current partner for about one year  She denies discharge, irritation, pain  She would like STI screening  The following portions of the patient's history were reviewed and updated as appropriate: allergies, current medications, past family history, past medical history, past social history, past surgical history and problem list     Review of Systems   Constitutional: Negative for chills and fever  Respiratory: Negative for shortness of breath  Cardiovascular: Negative for chest pain  Gastrointestinal: Negative for abdominal pain, constipation, diarrhea and nausea  Genitourinary: Negative for dysuria, frequency, urgency, vaginal bleeding and vaginal discharge  Objective:      /78 (BP Location: Left arm, Patient Position: Sitting, Cuff Size: Standard)   Ht 5' 8 75" (1 746 m)   Wt 106 kg (233 lb 6 4 oz)   LMP  (LMP Unknown)   BMI 34 72 kg/m²          Physical Exam   Constitutional: She appears well-developed and well-nourished  No distress  HENT:   Head: Normocephalic and atraumatic  Eyes: EOM are normal    Neck: Normal range of motion  Neck supple  No thyromegaly present  Pulmonary/Chest: Effort normal  Right breast exhibits no inverted nipple, no mass, no nipple discharge, no skin change and no tenderness  Left breast exhibits no inverted nipple, no mass, no nipple discharge, no skin change and no tenderness  Breasts are symmetrical    Abdominal: Soft  She exhibits no distension and no mass  There is no tenderness  There is no rebound and no guarding  Genitourinary: Vagina normal and uterus normal  Pelvic exam was performed with patient supine  There is no rash, tenderness, lesion or injury on the right labia  There is no rash, tenderness, lesion or injury on the left labia   Uterus is not enlarged and not tender  Cervix exhibits no motion tenderness, no discharge and no friability  Right adnexum displays no mass, no tenderness and no fullness  Left adnexum displays no mass, no tenderness and no fullness  No erythema, tenderness or bleeding in the vagina  No vaginal discharge found  Genitourinary Comments: Cervix small nulliparous  Uterus small and anteverted  No adnexal masses  Skin: She is not diaphoretic

## 2019-11-08 NOTE — ASSESSMENT & PLAN NOTE
21 yo G0 here for new patient annual     Pap collected today with HPV if ASCUS  Receiving HPV vaccines with PCP, next due around 11/20/19, last 3/20/20  Reviewed breast self awareness  Encouraged healthy weight maintenance with diet and exercise  Nexplanon in place for contraception and improvement in dysmenorrhea  Due to come out 3/6/2022  Sexually active  Agreeable to comprehensive STI screening  Discuss condom use for STI prevention

## 2019-11-14 ENCOUNTER — OFFICE VISIT (OUTPATIENT)
Dept: URGENT CARE | Facility: MEDICAL CENTER | Age: 19
End: 2019-11-14
Payer: MEDICARE

## 2019-11-14 VITALS
TEMPERATURE: 98.5 F | WEIGHT: 232.6 LBS | OXYGEN SATURATION: 97 % | BODY MASS INDEX: 34.45 KG/M2 | RESPIRATION RATE: 18 BRPM | DIASTOLIC BLOOD PRESSURE: 66 MMHG | HEIGHT: 69 IN | SYSTOLIC BLOOD PRESSURE: 90 MMHG | HEART RATE: 104 BPM

## 2019-11-14 DIAGNOSIS — G43.809 OTHER MIGRAINE WITHOUT STATUS MIGRAINOSUS, NOT INTRACTABLE: Primary | ICD-10-CM

## 2019-11-14 PROCEDURE — 99213 OFFICE O/P EST LOW 20 MIN: CPT | Performed by: PHYSICIAN ASSISTANT

## 2019-11-14 NOTE — PROGRESS NOTES
[facility]        NAME: Erin Hernández is a 22 y o  female  : 2000    MRN: [id number]  DATE: 2019  TIME: 5:20 PM    Assessment and Plan   Other migraine without status migrainosus, not intractable [G43 809]  1  Other migraine without status migrainosus, not intractable           Patient Instructions     1  Motrin as needed for residual headache  2  Follow up with PCP in 3-5 days if symptoms persist       Chief Complaint     Chief Complaint   Patient presents with    Migraine     Pt states she woke up with a migraine today  History of Present Illness       Riya Cuadra is a 73-year-old female presents for evaluation status post migraine headache that occurred earlier this morning  Patient reports history of migraine headaches, she averages 1-2 per month  She reports the symptoms started earlier this morning upon awakening; patient was able to take Imitrex and now has had complete resolution of symptoms  Review of Systems   Review of Systems   Constitutional: Negative  HENT: Negative  Eyes: Negative for photophobia and visual disturbance  Gastrointestinal: Negative for nausea and vomiting  Neurological: Negative for dizziness, weakness, numbness and headaches  Current Medications       Current Outpatient Medications:     butalbital-acetaminophen-caffeine (FIORICET,ESGIC) -40 mg per tablet, Take 1 tablet by mouth every 4 (four) hours as needed for headaches, Disp: 10 tablet, Rfl: 0    etonogestrel (NEXPLANON) subdermal implant, To be placed at our office  , Disp: , Rfl:     propranolol (INDERAL LA) 80 mg 24 hr capsule, Take 1 capsule (80 mg total) by mouth daily, Disp: 90 capsule, Rfl: 1    SUMAtriptan (IMITREX) 100 mg tablet, Take 1 tablet (100 mg total) by mouth once as needed for migraine for up to 1 dose, Disp: 9 tablet, Rfl: 1    venlafaxine (EFFEXOR-XR) 75 mg 24 hr capsule, , Disp: , Rfl:     albuterol (VENTOLIN HFA) 90 mcg/act inhaler, 1-2 puffs q6 hrs PRN, Disp: , Rfl:     ondansetron (ZOFRAN) 4 mg tablet, take 1 tablet by mouth every 8 hours if needed for nausea, Disp: , Rfl:     Current Allergies     Allergies as of 11/14/2019    (No Known Allergies)            The following portions of the patient's history were reviewed and updated as appropriate: allergies, current medications, past family history, past medical history, past social history, past surgical history and problem list      Past Medical History:   Diagnosis Date    Anxiety     Asthma     Depression     Exercise induced bronchospasm     Migraine     Migraines        Past Surgical History:   Procedure Laterality Date    MOUTH SURGERY         Family History   Problem Relation Age of Onset    Hypertension Mother     Hyperlipidemia Mother     Asthma Mother     Anxiety disorder Mother     Depression Mother     Migraines Mother     Hypertension Father     Arthritis Father     Asthma Father     Diabetes Father     Anxiety disorder Sister     Depression Sister     Endometriosis Sister     Depression Maternal Grandmother     Migraines Maternal Grandmother     Arthritis Paternal Grandfather     Diabetes Paternal Grandfather     Endometriosis Paternal Aunt          Medications have been verified  Objective   BP 90/66   Pulse 104   Temp 98 5 °F (36 9 °C) (Temporal)   Resp 18   Ht 5' 8 75" (1 746 m)   Wt 106 kg (232 lb 9 6 oz)   LMP  (LMP Unknown)   SpO2 97%   BMI 34 60 kg/m²        Physical Exam     Physical Exam   Constitutional: She is oriented to person, place, and time  She appears well-developed and well-nourished  No distress  HENT:   Head: Normocephalic and atraumatic     Right Ear: Tympanic membrane and ear canal normal    Left Ear: Tympanic membrane and ear canal normal    Nose: Nose normal    Mouth/Throat: Uvula is midline, oropharynx is clear and moist and mucous membranes are normal    Cardiovascular: Normal rate, regular rhythm and normal heart sounds  No murmur heard  Pulmonary/Chest: Effort normal and breath sounds normal    Neurological: She is alert and oriented to person, place, and time  She has normal strength  No cranial nerve deficit or sensory deficit

## 2019-11-14 NOTE — LETTER
November 14, 2019     Patient: Alize Lazo   YOB: 2000   Date of Visit: 11/14/2019       To Whom It May Concern: It is my medical opinion that Alize Lazo may return to work on 11/15/19  If you have any questions or concerns, please don't hesitate to call           Sincerely,        Vallorie Curling, PA-C    CC: Alize Lazo

## 2019-11-15 ENCOUNTER — OFFICE VISIT (OUTPATIENT)
Dept: URGENT CARE | Facility: MEDICAL CENTER | Age: 19
End: 2019-11-15
Payer: MEDICARE

## 2019-11-15 VITALS
OXYGEN SATURATION: 97 % | HEART RATE: 77 BPM | SYSTOLIC BLOOD PRESSURE: 112 MMHG | TEMPERATURE: 98.4 F | HEIGHT: 60 IN | DIASTOLIC BLOOD PRESSURE: 70 MMHG | RESPIRATION RATE: 16 BRPM | WEIGHT: 232 LBS | BODY MASS INDEX: 45.55 KG/M2

## 2019-11-15 DIAGNOSIS — R51.9 NONINTRACTABLE HEADACHE, UNSPECIFIED CHRONICITY PATTERN, UNSPECIFIED HEADACHE TYPE: Primary | ICD-10-CM

## 2019-11-15 PROCEDURE — 99213 OFFICE O/P EST LOW 20 MIN: CPT | Performed by: PHYSICIAN ASSISTANT

## 2019-11-15 NOTE — PROGRESS NOTES
[id number]        NAME: Melia Kawasaki is a 22 y o  female  : 2000    MRN: [id number]  DATE: November 15, 2019  TIME: 5:47 PM    Assessment and Plan   Nonintractable headache, unspecified chronicity pattern, unspecified headache type [R51]  1  Nonintractable headache, unspecified chronicity pattern, unspecified headache type           Patient Instructions     Headache    Follow up with PCP in 3-5 days  Proceed to  ER if symptoms worsen  Chief Complaint     Chief Complaint   Patient presents with    Headache     Started today with headaches and nausea, light sensitivity  Chronic problem  History of Present Illness       23 y/o female presents c/o headache  States the headache is gone but she did not go to work and needs a note  Denies dizziness, visual disturbances      Review of Systems   Review of Systems   Constitutional: Negative  HENT: Negative  Eyes: Negative  Respiratory: Negative  Negative for cough, chest tightness, shortness of breath, wheezing and stridor  Cardiovascular: Negative  Negative for chest pain, palpitations and leg swelling  Neurological: Positive for headaches  Current Medications       Current Outpatient Medications:     albuterol (VENTOLIN HFA) 90 mcg/act inhaler, 1-2 puffs q6 hrs PRN, Disp: , Rfl:     butalbital-acetaminophen-caffeine (FIORICET,ESGIC) -40 mg per tablet, Take 1 tablet by mouth every 4 (four) hours as needed for headaches, Disp: 10 tablet, Rfl: 0    etonogestrel (NEXPLANON) subdermal implant, To be placed at our office  , Disp: , Rfl:     ondansetron (ZOFRAN) 4 mg tablet, take 1 tablet by mouth every 8 hours if needed for nausea, Disp: , Rfl:     propranolol (INDERAL LA) 80 mg 24 hr capsule, Take 1 capsule (80 mg total) by mouth daily, Disp: 90 capsule, Rfl: 1    SUMAtriptan (IMITREX) 100 mg tablet, Take 1 tablet (100 mg total) by mouth once as needed for migraine for up to 1 dose, Disp: 9 tablet, Rfl: 1   venlafaxine (EFFEXOR-XR) 75 mg 24 hr capsule, , Disp: , Rfl:     Current Allergies     Allergies as of 11/15/2019    (No Known Allergies)            The following portions of the patient's history were reviewed and updated as appropriate: allergies, current medications, past family history, past medical history, past social history, past surgical history and problem list      Past Medical History:   Diagnosis Date    Anxiety     Asthma     Depression     Exercise induced bronchospasm     Migraine     Migraines        Past Surgical History:   Procedure Laterality Date    MOUTH SURGERY         Family History   Problem Relation Age of Onset    Hypertension Mother     Hyperlipidemia Mother     Asthma Mother     Anxiety disorder Mother     Depression Mother     Migraines Mother     Hypertension Father     Arthritis Father     Asthma Father     Diabetes Father     Anxiety disorder Sister     Depression Sister     Endometriosis Sister     Depression Maternal Grandmother     Migraines Maternal Grandmother     Arthritis Paternal Grandfather     Diabetes Paternal Grandfather     Endometriosis Paternal Aunt          Medications have been verified  Objective   /70   Pulse 77   Temp 98 4 °F (36 9 °C) (Temporal)   Resp 16   Ht 5' (1 524 m)   Wt 105 kg (232 lb)   LMP  (LMP Unknown)   SpO2 97%   BMI 45 31 kg/m²        Physical Exam     Physical Exam   Constitutional: She is oriented to person, place, and time  She appears well-developed and well-nourished  HENT:   Head: Normocephalic and atraumatic  Right Ear: External ear normal    Left Ear: External ear normal    Nose: Nose normal    Mouth/Throat: Oropharynx is clear and moist  No oropharyngeal exudate  Neck: Normal range of motion  Neck supple  Cardiovascular: Normal rate, regular rhythm, normal heart sounds and intact distal pulses  Pulmonary/Chest: Effort normal and breath sounds normal  No respiratory distress   She has no wheezes  She has no rales  She exhibits no tenderness  Lymphadenopathy:     She has no cervical adenopathy  Neurological: She is alert and oriented to person, place, and time  She has normal strength  No cranial nerve deficit or sensory deficit  She displays a negative Romberg sign  GCS eye subscore is 4  GCS verbal subscore is 5  GCS motor subscore is 6

## 2019-11-15 NOTE — PATIENT INSTRUCTIONS
Headache    Follow up with PCP in 3-5 days  Proceed to  ER if symptoms worsen  Rhinosinusitis   WHAT YOU NEED TO KNOW:   Rhinosinusitis (RS) is inflammation of your nose and sinuses  It commonly begins as a virus, often as a common cold  Viruses usually last 7 to 10 days and do not need treatment  When the virus does not get better on its own, you may have bacterial RS  This means that bacteria have begun to grow inside your sinuses  Acute RS lasts less than 4 weeks  Chronic RS lasts 12 weeks or more  Recurrent RS is when you have 4 or more episodes of RS in one year  DISCHARGE INSTRUCTIONS:   Return to the emergency department if:   · Your eye and eyelid are red, swollen, and painful  · You cannot open your eye  · You have double vision or you cannot see  · Your eyeball bulges out or you cannot move your eye  · You are more sleepy than normal or you notice changes in your ability to think, move, or talk  · You have a stiff neck, a fever, or a bad headache  · You have swelling of your forehead or scalp  Contact your healthcare provider if:   · Your symptoms are worse or do not improve after 3 to 5 days of treatment  · You have questions or concerns about your condition or care  Medicines: You may need any of the following:  · Acetaminophen  decreases pain and fever  It is available without a doctor's order  Ask how much to take and how often to take it  Follow directions  Acetaminophen can cause liver damage if not taken correctly  · NSAIDs , such as ibuprofen, help decrease swelling, pain, and fever  This medicine is available with or without a doctor's order  NSAIDs can cause stomach bleeding or kidney problems in certain people  If you take blood thinner medicine, always ask your healthcare provider if NSAIDs are safe for you  Always read the medicine label and follow directions  · Nasal steroid sprays  decrease inflammation in your nose and sinuses      · Decongestants reduce swelling and drain mucus in the nose and sinuses  They may help you breathe easier  · Antihistamines  dry mucus in the nose and relieve sneezing  · Antibiotics  treat a bacterial infection and may be needed if your symptoms do not improve or they get worse  · Take your medicine as directed  Contact your healthcare provider if you think your medicine is not helping or if you have side effects  Tell him or her if you are allergic to any medicine  Keep a list of the medicines, vitamins, and herbs you take  Include the amounts, and when and why you take them  Bring the list or the pill bottles to follow-up visits  Carry your medicine list with you in case of an emergency  Self-care:   · Rinse your sinuses  Use a sinus rinse device to rinse your nasal passages with a saline (salt water) solution  This will help thin the mucus in your nose and rinse away pollen and dirt  It will also help reduce swelling so you can breathe normally  Ask your healthcare provider how often to do this  · Breathe in steam   Heat a bowl of water until you see steam  Lean over the bowl and make a tent over your head with a large towel  Breathe deeply for about 20 minutes  Be careful not to get too close to the steam or burn yourself  Do this 3 times a day  You can also breathe deeply when you take a hot shower  · Sleep with your head elevated  Place an extra pillow under your head before you go to sleep to help your sinuses drain  · Drink liquids as directed  Ask your healthcare provider how much liquid to drink each day and which liquids are best for you  Liquids will thin the mucus in your nose and help it drain  Avoid drinks that contain alcohol or caffeine  · Do not smoke, and avoid secondhand smoke  Nicotine and other chemicals in cigarettes and cigars can make your symptoms worse  Ask your healthcare provider for information if you currently smoke and need help to quit   E-cigarettes or smokeless tobacco still contain nicotine  Talk to your healthcare provider before you use these products  Follow up with your healthcare provider as directed: Follow up if your symptoms are worse or not better after 3 to 5 days of treatment  Write down your questions so you remember to ask them during your visits  © 2017 [employer] Information is for End User's use only and may not be sold, redistributed or otherwise used for commercial purposes  All illustrations and images included in CareNotes® are the copyrighted property of [employer]  or [employer]  The above information is an  only  It is not intended as medical advice for individual conditions or treatments  Talk to your doctor, nurse or pharmacist before following any medical regimen to see if it is safe and effective for you

## 2019-11-15 NOTE — LETTER
November 15, 2019     Patient: Keegan Lyles   YOB: 2000   Date of Visit: 11/15/2019       To Whom it May Concern:    Keegan Lyles was seen in my clinic on 11/15/2019  She may return to work on 11/16/19  If you have any questions or concerns, please don't hesitate to call           Sincerely,          [facility]        CC: Keegan Lyles

## 2019-11-16 ENCOUNTER — APPOINTMENT (OUTPATIENT)
Dept: RADIOLOGY | Facility: MEDICAL CENTER | Age: 19
End: 2019-11-16
Payer: MEDICARE

## 2019-11-16 ENCOUNTER — OFFICE VISIT (OUTPATIENT)
Dept: URGENT CARE | Facility: MEDICAL CENTER | Age: 19
End: 2019-11-16
Payer: MEDICARE

## 2019-11-16 VITALS
OXYGEN SATURATION: 99 % | WEIGHT: 234 LBS | DIASTOLIC BLOOD PRESSURE: 74 MMHG | SYSTOLIC BLOOD PRESSURE: 108 MMHG | HEART RATE: 84 BPM | BODY MASS INDEX: 35.46 KG/M2 | RESPIRATION RATE: 18 BRPM | TEMPERATURE: 98 F | HEIGHT: 68 IN

## 2019-11-16 DIAGNOSIS — M54.6 ACUTE MIDLINE THORACIC BACK PAIN: ICD-10-CM

## 2019-11-16 DIAGNOSIS — M54.6 ACUTE MIDLINE THORACIC BACK PAIN: Primary | ICD-10-CM

## 2019-11-16 PROCEDURE — 72070 X-RAY EXAM THORAC SPINE 2VWS: CPT

## 2019-11-16 PROCEDURE — 99213 OFFICE O/P EST LOW 20 MIN: CPT | Performed by: PHYSICIAN ASSISTANT

## 2019-11-16 RX ORDER — NAPROXEN 500 MG/1
500 TABLET ORAL 2 TIMES DAILY WITH MEALS
Qty: 20 TABLET | Refills: 0 | Status: SHIPPED | OUTPATIENT
Start: 2019-11-16 | End: 2019-11-26

## 2019-11-16 RX ORDER — METHOCARBAMOL 500 MG/1
500 TABLET, FILM COATED ORAL 4 TIMES DAILY
Qty: 20 TABLET | Refills: 0 | Status: SHIPPED | OUTPATIENT
Start: 2019-11-16 | End: 2019-11-21

## 2019-11-16 NOTE — PROGRESS NOTES
[address] [facility]        NAME: Joseph Catherine is a 22 y o  female  : 2000    MRN: [id number]  DATE: 2019  TIME: 6:37 PM    Assessment and Plan   Acute midline thoracic back pain [M54 6]  1  Acute midline thoracic back pain  XR spine thoracic 2 vw    methocarbamol (ROBAXIN) 500 mg tablet    naproxen (NAPROSYN) 500 mg tablet         Patient Instructions     Upper back pain  Robaxin as directed- may become drowsy  Naprosyn twice daily  Follow up with PCP in 3-5 days  Proceed to  ER if symptoms worsen  Chief Complaint     Chief Complaint   Patient presents with    Back Pain     Pt  C/O upper back pain that began this morning after she woke  Denies trauma to the area  History of Present Illness       23 y/o female presents c/o upper back pain  States she went to bed and woke up with pain mid line  States the pain is reproduced with ROM and with touch  Denies fever, chills, trauma      Review of Systems   Review of Systems   Constitutional: Negative  HENT: Negative  Eyes: Negative  Respiratory: Negative  Negative for cough, chest tightness, shortness of breath, wheezing and stridor  Cardiovascular: Negative  Negative for chest pain, palpitations and leg swelling  Musculoskeletal: Positive for back pain  Current Medications       Current Outpatient Medications:     albuterol (VENTOLIN HFA) 90 mcg/act inhaler, 1-2 puffs q6 hrs PRN, Disp: , Rfl:     butalbital-acetaminophen-caffeine (FIORICET,ESGIC) -40 mg per tablet, Take 1 tablet by mouth every 4 (four) hours as needed for headaches, Disp: 10 tablet, Rfl: 0    etonogestrel (NEXPLANON) subdermal implant, To be placed at our office  , Disp: , Rfl:     ondansetron (ZOFRAN) 4 mg tablet, take 1 tablet by mouth every 8 hours if needed for nausea, Disp: , Rfl:     propranolol (INDERAL LA) 80 mg 24 hr capsule, Take 1 capsule (80 mg total) by mouth daily, Disp: 90 capsule, Rfl: 1    SUMAtriptan (IMITREX) 100 mg tablet, Take 1 tablet (100 mg total) by mouth once as needed for migraine for up to 1 dose, Disp: 9 tablet, Rfl: 1    venlafaxine (EFFEXOR-XR) 75 mg 24 hr capsule, , Disp: , Rfl:     methocarbamol (ROBAXIN) 500 mg tablet, Take 1 tablet (500 mg total) by mouth 4 (four) times a day for 5 days, Disp: 20 tablet, Rfl: 0    naproxen (NAPROSYN) 500 mg tablet, Take 1 tablet (500 mg total) by mouth 2 (two) times a day with meals for 10 days, Disp: 20 tablet, Rfl: 0    Current Allergies     Allergies as of 11/16/2019    (No Known Allergies)            The following portions of the patient's history were reviewed and updated as appropriate: allergies, current medications, past family history, past medical history, past social history, past surgical history and problem list      Past Medical History:   Diagnosis Date    Anxiety     Asthma     Depression     Exercise induced bronchospasm     Migraine     Migraines        Past Surgical History:   Procedure Laterality Date    MOUTH SURGERY         Family History   Problem Relation Age of Onset    Hypertension Mother     Hyperlipidemia Mother     Asthma Mother     Anxiety disorder Mother     Depression Mother     Migraines Mother     Hypertension Father     Arthritis Father     Asthma Father     Diabetes Father     Anxiety disorder Sister     Depression Sister     Endometriosis Sister     Depression Maternal Grandmother     Migraines Maternal Grandmother     Arthritis Paternal Grandfather     Diabetes Paternal Grandfather     Endometriosis Paternal Aunt          Medications have been verified  Objective   /74   Pulse 84   Temp 98 °F (36 7 °C) (Temporal)   Resp 18   Ht 5' 8" (1 727 m)   Wt 106 kg (234 lb)   LMP  (LMP Unknown)   SpO2 99%   BMI 35 58 kg/m²        Physical Exam     Physical Exam   Constitutional: She appears well-developed and well-nourished  HENT:   Head: Normocephalic and atraumatic     Neck: Normal range of motion  Neck supple  Cardiovascular: Normal rate, regular rhythm, normal heart sounds and intact distal pulses  Pulmonary/Chest: Effort normal and breath sounds normal  No respiratory distress  She has no wheezes  She has no rales  She exhibits no tenderness  Musculoskeletal:        Thoracic back: She exhibits tenderness, bony tenderness, pain and spasm  She exhibits normal range of motion, no swelling, no edema, no deformity, no laceration and normal pulse  Lymphadenopathy:     She has no cervical adenopathy

## 2019-11-16 NOTE — PATIENT INSTRUCTIONS
Upper back pain  Robaxin as directed- may become drowsy  Naprosyn twice daily  Follow up with PCP in 3-5 days  Proceed to  ER if symptoms worsen  Back Pain   WHAT YOU NEED TO KNOW:   Back pain is common  It can be caused by many conditions, such as arthritis or the breakdown of spinal discs  Your risk for back pain is increased by injuries, lack of activity, or repeated bending and twisting  You may feel sore or stiff on one or both sides of your back  The pain may spread to your buttocks or thighs  DISCHARGE INSTRUCTIONS:   Medicines:   · NSAIDs  help decrease swelling and pain  This medicine is available with or without a doctor's order  NSAIDs can cause stomach bleeding or kidney problems in certain people  If you take blood thinner medicine, always ask your healthcare provider if NSAIDs are safe for you  Always read the medicine label and follow directions  · Acetaminophen  decreases pain  It is available without a doctor's order  Ask how much to take and how often to take it  Follow directions  Acetaminophen can cause liver damage if not taken correctly  · Prescription pain medicine  may be given  Ask your healthcare provider how to take this medicine safely  · Take your medicine as directed  Contact your healthcare provider if you think your medicine is not helping or if you have side effects  Tell him or her if you are allergic to any medicine  Keep a list of the medicines, vitamins, and herbs you take  Include the amounts, and when and why you take them  Bring the list or the pill bottles to follow-up visits  Carry your medicine list with you in case of an emergency  Follow up with your healthcare provider in 2 weeks, or as directed:  Write down your questions so you remember to ask them during your visits  How to manage your back pain:   · Apply ice  on your back or affected area for 15 to 20 minutes every hour or as directed  Use an ice pack, or put crushed ice in a plastic bag   Cover it with a towel  Ice helps prevent tissue damage and decreases pain  · Apply heat  on your back or affected area for 20 to 30 minutes every 2 hours for as many days as directed  Heat helps decrease pain and muscle spasms  · Stay active  as much as you can without causing more pain  Bed rest could make your back pain worse  Avoid heavy lifting until your pain is gone  Return to the emergency department if:   · You have pain, numbness, or weakness in one or both legs  · Your pain becomes so severe that you cannot walk  · You cannot control your urine or bowel movements  · You have severe back pain with chest pain  · You have severe back pain, nausea, and vomiting  · You have severe back pain that spreads to your side or genital area  Contact your healthcare provider if:   · You have back pain that does not get better with rest and pain medicine  · You have a fever  · You have pain that worsens when you are on your back or when you rest     · You have pain that worsens when you cough or sneeze  · You lose weight without trying  · You have questions or concerns about your condition or care  © 2017 [facility] Information is for End User's use only and may not be sold, redistributed or otherwise used for commercial purposes  All illustrations and images included in CareNotes® are the copyrighted property of [employer]  or [employer]  The above information is an  only  It is not intended as medical advice for individual conditions or treatments  Talk to your doctor, nurse or pharmacist before following any medical regimen to see if it is safe and effective for you

## 2019-11-25 LAB
LAB AP GYN PRIMARY INTERPRETATION: NORMAL
Lab: NORMAL

## 2019-11-27 ENCOUNTER — TELEPHONE (OUTPATIENT)
Dept: OBGYN CLINIC | Facility: MEDICAL CENTER | Age: 19
End: 2019-11-27

## 2019-12-04 ENCOUNTER — OFFICE VISIT (OUTPATIENT)
Dept: URGENT CARE | Facility: MEDICAL CENTER | Age: 19
End: 2019-12-04
Payer: MEDICARE

## 2019-12-04 VITALS
OXYGEN SATURATION: 98 % | RESPIRATION RATE: 18 BRPM | TEMPERATURE: 99.3 F | HEART RATE: 86 BPM | DIASTOLIC BLOOD PRESSURE: 70 MMHG | WEIGHT: 237 LBS | SYSTOLIC BLOOD PRESSURE: 110 MMHG | BODY MASS INDEX: 35.92 KG/M2 | HEIGHT: 68 IN

## 2019-12-04 DIAGNOSIS — R11.2 NON-INTRACTABLE VOMITING WITH NAUSEA, UNSPECIFIED VOMITING TYPE: ICD-10-CM

## 2019-12-04 DIAGNOSIS — F41.9 ANXIETY: Primary | ICD-10-CM

## 2019-12-04 PROCEDURE — 99213 OFFICE O/P EST LOW 20 MIN: CPT | Performed by: PHYSICIAN ASSISTANT

## 2019-12-04 NOTE — LETTER
December 4, 2019     Patient: Rakan Stoner   YOB: 2000   Date of Visit: 12/4/2019       To Whom it May Concern:    Rakan Stoner was seen in my clinic on 12/4/2019  She may be excused from work today  If you have any questions or concerns, please don't hesitate to call           Sincerely,          Jana Lamb PA-C        CC: Rakan Stoner

## 2019-12-05 NOTE — PATIENT INSTRUCTIONS
Please follow up with primary care for further management of anxiety   report to ER if symptoms worsen    Anxiety   WHAT YOU NEED TO KNOW:   Anxiety is a condition that causes you to feel extremely worried or nervous  The feelings are so strong that they can cause problems with your daily activities or sleep  Anxiety may be triggered by something you fear, or it may happen without a cause  Family or work stress, smoking, caffeine, and alcohol can increase your risk for anxiety  Certain medicines or health conditions can also increase your risk  Anxiety can become a long-term condition if it is not managed or treated  DISCHARGE INSTRUCTIONS:   Call 911 if:   · You have chest pain, tightness, or heaviness that may spread to your shoulders, arms, jaw, neck, or back  · You feel like hurting yourself or someone else  Contact your healthcare provider if:   · Your symptoms get worse or do not get better with treatment  · Your anxiety keeps you from doing your regular daily activities  · You have new symptoms since your last visit  · You have questions or concerns about your condition or care  Medicines:   · Medicines  may be given to help you feel more calm and relaxed, and decrease your symptoms  · Take your medicine as directed  Contact your healthcare provider if you think your medicine is not helping or if you have side effects  Tell him of her if you are allergic to any medicine  Keep a list of the medicines, vitamins, and herbs you take  Include the amounts, and when and why you take them  Bring the list or the pill bottles to follow-up visits  Carry your medicine list with you in case of an emergency  Follow up with your healthcare provider within 2 weeks or as directed:  Write down your questions so you remember to ask them during your visits  Manage anxiety:   · Talk to someone about your anxiety  Your healthcare provider may suggest counseling   Cognitive behavioral therapy can help you understand and change how you react to events that trigger your symptoms  You might feel more comfortable talking with a friend or family member about your anxiety  Choose someone you know will be supportive and encouraging  · Find ways to relax  Activities such as exercise, meditation, or listening to music can help you relax  Spend time with friends, or do things you enjoy  · Practice deep breathing  Deep breathing can help you relax when you feel anxious  Focus on taking slow, deep breaths several times a day, or during an anxiety attack  Breathe in through your nose and out through your mouth  · Create a regular sleep routine  Regular sleep can help you feel calmer during the day  Go to sleep and wake up at the same times every day  Do not watch television or use the computer right before bed  Your room should be comfortable, dark, and quiet  · Eat a variety of healthy foods  Healthy foods include fruits, vegetables, low-fat dairy products, lean meats, fish, whole-grain breads, and cooked beans  Healthy foods can help you feel less anxious and have more energy  · Exercise regularly  Exercise can increase your energy level  Exercise may also lift your mood and help you sleep better  Your healthcare provider can help you create an exercise plan  · Do not smoke  Nicotine and other chemicals in cigarettes and cigars can increase anxiety  Ask your healthcare provider for information if you currently smoke and need help to quit  E-cigarettes or smokeless tobacco still contain nicotine  Talk to your healthcare provider before you use these products  · Do not have caffeine  Caffeine can make your symptoms worse  Do not have foods or drinks that are meant to increase your energy level  · Limit or do not drink alcohol  Ask your healthcare provider if alcohol is safe for you  You may not be able to drink alcohol if you take certain anxiety or depression medicines   Limit alcohol to 1 drink per day if you are a woman  Limit alcohol to 2 drinks per day if you are a man  A drink of alcohol is 12 ounces of beer, 5 ounces of wine, or 1½ ounces of liquor  · Do not use drugs  Drugs can make your anxiety worse  It can also make anxiety hard to manage  Talk to your healthcare provider if you use drugs and want help to quit  © 2017 [employer] Information is for End User's use only and may not be sold, redistributed or otherwise used for commercial purposes  All illustrations and images included in CareNotes® are the copyrighted property of [employer]  or [employer]  The above information is an  only  It is not intended as medical advice for individual conditions or treatments  Talk to your doctor, nurse or pharmacist before following any medical regimen to see if it is safe and effective for you

## 2019-12-05 NOTE — PROGRESS NOTES
[address] [facility]        NAME: Amadeo Ross is a 22 y o  female  : 2000    MRN: [id number]  DATE: 2019  TIME: 7:47 PM    Assessment and Plan   Anxiety [F41 9]  1  Anxiety     2  Non-intractable vomiting with nausea, unspecified vomiting type         Exam normal   Recommended follow-up with PCP for further management of anxiety medication  Work note given  Patient Instructions       Please follow up with primary care for further management of anxiety   report to ER if symptoms worsen  Chief Complaint     Chief Complaint   Patient presents with    Vomiting     Pt states she had an anxiety attack this morning  Pt vomitted after that attack  History of Present Illness        Patient is a 78-year-old female who presents today after having an anxiety attack and an episode of vomiting  She reports last night she got a phone call from her boss and she did not want to go into work today as it was not a nice message, this made her anxious and she had an anxiety attack  She has had these in the past but has not had one in a while  She is currently on Effexor to help control this  Patient needs a work note for today as she did not go in  Review of Systems   Review of Systems   Constitutional: Negative for fever  Respiratory: Negative for shortness of breath  Cardiovascular: Negative for chest pain  Gastrointestinal: Positive for vomiting  Negative for abdominal pain  Psychiatric/Behavioral: The patient is nervous/anxious  Current Medications       Current Outpatient Medications:     etonogestrel (NEXPLANON) subdermal implant, To be placed at our office  , Disp: , Rfl:     ondansetron (ZOFRAN) 4 mg tablet, take 1 tablet by mouth every 8 hours if needed for nausea, Disp: , Rfl:     propranolol (INDERAL LA) 80 mg 24 hr capsule, Take 1 capsule (80 mg total) by mouth daily, Disp: 90 capsule, Rfl: 1    SUMAtriptan (IMITREX) 100 mg tablet, Take 1 tablet (100 mg total) by mouth once as needed for migraine for up to 1 dose, Disp: 9 tablet, Rfl: 1    venlafaxine (EFFEXOR-XR) 75 mg 24 hr capsule, , Disp: , Rfl:     albuterol (VENTOLIN HFA) 90 mcg/act inhaler, 1-2 puffs q6 hrs PRN, Disp: , Rfl:     butalbital-acetaminophen-caffeine (FIORICET,ESGIC) -40 mg per tablet, Take 1 tablet by mouth every 4 (four) hours as needed for headaches (Patient not taking: Reported on 12/4/2019), Disp: 10 tablet, Rfl: 0    methocarbamol (ROBAXIN) 500 mg tablet, Take 1 tablet (500 mg total) by mouth 4 (four) times a day for 5 days, Disp: 20 tablet, Rfl: 0    naproxen (NAPROSYN) 500 mg tablet, Take 1 tablet (500 mg total) by mouth 2 (two) times a day with meals for 10 days, Disp: 20 tablet, Rfl: 0    Current Allergies     Allergies as of 12/04/2019    (No Known Allergies)            The following portions of the patient's history were reviewed and updated as appropriate: allergies, current medications, past family history, past medical history, past social history, past surgical history and problem list      Past Medical History:   Diagnosis Date    Anxiety     Asthma     Depression     Exercise induced bronchospasm     Migraine     Migraines        Past Surgical History:   Procedure Laterality Date    MOUTH SURGERY         Family History   Problem Relation Age of Onset    Hypertension Mother     Hyperlipidemia Mother     Asthma Mother     Anxiety disorder Mother     Depression Mother     Migraines Mother     Hypertension Father     Arthritis Father     Asthma Father     Diabetes Father     Anxiety disorder Sister     Depression Sister     Endometriosis Sister     Depression Maternal Grandmother     Migraines Maternal Grandmother     Arthritis Paternal Grandfather     Diabetes Paternal Grandfather     Endometriosis Paternal Aunt          Medications have been verified          Objective   /70   Pulse 86   Temp 99 3 °F (37 4 °C) (Temporal)   Resp 18   Ht 5' 8" (1 727 m)   Wt 108 kg (237 lb)   LMP  (LMP Unknown)   SpO2 98%   BMI 36 04 kg/m²        Physical Exam     Physical Exam   Constitutional: She appears well-developed and well-nourished  No distress  HENT:   Head: Normocephalic and atraumatic  Mouth/Throat: Oropharynx is clear and moist    Cardiovascular: Normal rate and regular rhythm  Pulmonary/Chest: Effort normal and breath sounds normal    Abdominal: Soft  Bowel sounds are normal  She exhibits no distension  There is no tenderness  Skin: Skin is warm and dry  Psychiatric: She has a normal mood and affect

## 2019-12-28 ENCOUNTER — OFFICE VISIT (OUTPATIENT)
Dept: URGENT CARE | Facility: MEDICAL CENTER | Age: 19
End: 2019-12-28
Payer: MEDICARE

## 2019-12-28 VITALS
WEIGHT: 239 LBS | SYSTOLIC BLOOD PRESSURE: 145 MMHG | DIASTOLIC BLOOD PRESSURE: 88 MMHG | HEART RATE: 110 BPM | TEMPERATURE: 99.3 F | BODY MASS INDEX: 36.22 KG/M2 | HEIGHT: 68 IN | OXYGEN SATURATION: 95 % | RESPIRATION RATE: 18 BRPM

## 2019-12-28 DIAGNOSIS — R51.9 INTRACTABLE HEADACHE, UNSPECIFIED CHRONICITY PATTERN, UNSPECIFIED HEADACHE TYPE: Primary | ICD-10-CM

## 2019-12-28 PROCEDURE — 99213 OFFICE O/P EST LOW 20 MIN: CPT | Performed by: PHYSICIAN ASSISTANT

## 2019-12-28 NOTE — PROGRESS NOTES
[email or web address]        NAME: Amadeo Ross is a 22 y o  female  : 2000    MRN: [id number]  DATE: 2019  TIME: 6:24 PM    Assessment and Plan   Intractable headache, unspecified chronicity pattern, unspecified headache type [R51]  1  Intractable headache, unspecified chronicity pattern, unspecified headache type           Patient Instructions     headache  Follow up with PCP in 3-5 days  Proceed to  ER if symptoms worsen  Chief Complaint     Chief Complaint   Patient presents with    Headache     Pt  with a headache for the past two days  History of Present Illness       23 y/o female states she had a migraine which has resolved but had to call out of work and needs an excuse      Review of Systems   Review of Systems   Constitutional: Negative  HENT: Negative  Eyes: Negative  Respiratory: Negative  Negative for cough, chest tightness, shortness of breath, wheezing and stridor  Cardiovascular: Negative  Negative for chest pain, palpitations and leg swelling  Neurological: Positive for headaches  Current Medications       Current Outpatient Medications:     albuterol (VENTOLIN HFA) 90 mcg/act inhaler, 1-2 puffs q6 hrs PRN, Disp: , Rfl:     etonogestrel (NEXPLANON) subdermal implant, To be placed at our office  , Disp: , Rfl:     ondansetron (ZOFRAN) 4 mg tablet, take 1 tablet by mouth every 8 hours if needed for nausea, Disp: , Rfl:     propranolol (INDERAL LA) 80 mg 24 hr capsule, Take 1 capsule (80 mg total) by mouth daily, Disp: 90 capsule, Rfl: 1    SUMAtriptan (IMITREX) 100 mg tablet, Take 1 tablet (100 mg total) by mouth once as needed for migraine for up to 1 dose, Disp: 9 tablet, Rfl: 1    venlafaxine (EFFEXOR-XR) 75 mg 24 hr capsule, , Disp: , Rfl:     butalbital-acetaminophen-caffeine (FIORICET,ESGIC) -40 mg per tablet, Take 1 tablet by mouth every 4 (four) hours as needed for headaches (Patient not taking: Reported on 2019), Disp: 10 tablet, Rfl: 0    methocarbamol (ROBAXIN) 500 mg tablet, Take 1 tablet (500 mg total) by mouth 4 (four) times a day for 5 days, Disp: 20 tablet, Rfl: 0    naproxen (NAPROSYN) 500 mg tablet, Take 1 tablet (500 mg total) by mouth 2 (two) times a day with meals for 10 days, Disp: 20 tablet, Rfl: 0    Current Allergies     Allergies as of 12/28/2019    (No Known Allergies)            The following portions of the patient's history were reviewed and updated as appropriate: allergies, current medications, past family history, past medical history, past social history, past surgical history and problem list      Past Medical History:   Diagnosis Date    Anxiety     Asthma     Depression     Exercise induced bronchospasm     Migraine     Migraines        Past Surgical History:   Procedure Laterality Date    MOUTH SURGERY         Family History   Problem Relation Age of Onset    Hypertension Mother     Hyperlipidemia Mother     Asthma Mother     Anxiety disorder Mother     Depression Mother     Migraines Mother     Hypertension Father     Arthritis Father     Asthma Father     Diabetes Father     Anxiety disorder Sister     Depression Sister     Endometriosis Sister     Depression Maternal Grandmother     Migraines Maternal Grandmother     Arthritis Paternal Grandfather     Diabetes Paternal Grandfather     Endometriosis Paternal Aunt          Medications have been verified  Objective   /88   Pulse (!) 110   Temp 99 3 °F (37 4 °C) (Temporal)   Resp 18   Ht 5' 8" (1 727 m)   Wt 108 kg (239 lb)   SpO2 95%   BMI 36 34 kg/m²        Physical Exam     Physical Exam   Constitutional: She is oriented to person, place, and time  She appears well-developed and well-nourished  HENT:   Head: Normocephalic and atraumatic  Right Ear: External ear normal    Left Ear: External ear normal    Nose: Nose normal    Mouth/Throat: Oropharynx is clear and moist  No oropharyngeal exudate  Neck: Normal range of motion  Neck supple  Cardiovascular: Normal rate, regular rhythm, normal heart sounds and intact distal pulses  Pulmonary/Chest: Effort normal and breath sounds normal  No respiratory distress  She has no wheezes  She has no rales  She exhibits no tenderness  Lymphadenopathy:     She has no cervical adenopathy  Neurological: She is alert and oriented to person, place, and time  She has normal strength  She is not disoriented  A cranial nerve deficit and sensory deficit is present  She displays a negative Romberg sign  GCS eye subscore is 4  GCS verbal subscore is 5  GCS motor subscore is 6

## 2019-12-29 ENCOUNTER — OFFICE VISIT (OUTPATIENT)
Dept: URGENT CARE | Facility: MEDICAL CENTER | Age: 19
End: 2019-12-29
Payer: MEDICARE

## 2019-12-29 VITALS
BODY MASS INDEX: 36.37 KG/M2 | RESPIRATION RATE: 18 BRPM | OXYGEN SATURATION: 97 % | HEIGHT: 68 IN | SYSTOLIC BLOOD PRESSURE: 104 MMHG | HEART RATE: 82 BPM | TEMPERATURE: 98.7 F | DIASTOLIC BLOOD PRESSURE: 72 MMHG | WEIGHT: 240 LBS

## 2019-12-29 DIAGNOSIS — R51.9 INTRACTABLE HEADACHE, UNSPECIFIED CHRONICITY PATTERN, UNSPECIFIED HEADACHE TYPE: Primary | ICD-10-CM

## 2019-12-29 PROCEDURE — 99213 OFFICE O/P EST LOW 20 MIN: CPT | Performed by: PHYSICIAN ASSISTANT

## 2019-12-29 NOTE — PROGRESS NOTES
[email or web address]        NAME: Keegan Lyles is a 22 y o  female  : 2000    MRN: [id number]  DATE: 2019  TIME: 4:51 PM    Assessment and Plan   Intractable headache, unspecified chronicity pattern, unspecified headache type [R51]  1  Intractable headache, unspecified chronicity pattern, unspecified headache type           Patient Instructions     headache  Follow up with PCP in 3-5 days  Proceed to  ER if symptoms worsen  Chief Complaint     Chief Complaint   Patient presents with    Headache     Pt  with migraine  was seen here yesterday for the same  History of Present Illness       23 y/o female seen multiple times with c/o head ahce who states she is not symptomatic but needs an excuse for work  I have explained to the patient that I can no longer provide her with excuse notes when she is not symptomatic  Patient verbalized understanding      Review of Systems   Review of Systems   Constitutional: Negative  HENT: Negative  Eyes: Negative  Respiratory: Negative  Negative for cough, chest tightness, shortness of breath, wheezing and stridor  Cardiovascular: Negative  Negative for chest pain, palpitations and leg swelling  Current Medications       Current Outpatient Medications:     albuterol (VENTOLIN HFA) 90 mcg/act inhaler, 1-2 puffs q6 hrs PRN, Disp: , Rfl:     etonogestrel (NEXPLANON) subdermal implant, To be placed at our office  , Disp: , Rfl:     ondansetron (ZOFRAN) 4 mg tablet, take 1 tablet by mouth every 8 hours if needed for nausea, Disp: , Rfl:     propranolol (INDERAL LA) 80 mg 24 hr capsule, Take 1 capsule (80 mg total) by mouth daily, Disp: 90 capsule, Rfl: 1    SUMAtriptan (IMITREX) 100 mg tablet, Take 1 tablet (100 mg total) by mouth once as needed for migraine for up to 1 dose, Disp: 9 tablet, Rfl: 1    venlafaxine (EFFEXOR-XR) 75 mg 24 hr capsule, , Disp: , Rfl:     butalbital-acetaminophen-caffeine ([address] [place] [address]) -12 mg per tablet, Take 1 tablet by mouth every 4 (four) hours as needed for headaches (Patient not taking: Reported on 12/4/2019), Disp: 10 tablet, Rfl: 0    methocarbamol (ROBAXIN) 500 mg tablet, Take 1 tablet (500 mg total) by mouth 4 (four) times a day for 5 days, Disp: 20 tablet, Rfl: 0    naproxen (NAPROSYN) 500 mg tablet, Take 1 tablet (500 mg total) by mouth 2 (two) times a day with meals for 10 days, Disp: 20 tablet, Rfl: 0    Current Allergies     Allergies as of 12/29/2019    (No Known Allergies)            The following portions of the patient's history were reviewed and updated as appropriate: allergies, current medications, past family history, past medical history, past social history, past surgical history and problem list      Past Medical History:   Diagnosis Date    Anxiety     Asthma     Depression     Exercise induced bronchospasm     Migraine     Migraines        Past Surgical History:   Procedure Laterality Date    MOUTH SURGERY         Family History   Problem Relation Age of Onset    Hypertension Mother     Hyperlipidemia Mother     Asthma Mother     Anxiety disorder Mother     Depression Mother     Migraines Mother     Hypertension Father     Arthritis Father     Asthma Father     Diabetes Father     Anxiety disorder Sister     Depression Sister     Endometriosis Sister     Depression Maternal Grandmother     Migraines Maternal Grandmother     Arthritis Paternal Grandfather     Diabetes Paternal Grandfather     Endometriosis Paternal Aunt          Medications have been verified  Objective   /72   Pulse 82   Temp 98 7 °F (37 1 °C) (Temporal)   Resp 18   Ht 5' 8" (1 727 m)   Wt 109 kg (240 lb)   SpO2 97%   BMI 36 49 kg/m²        Physical Exam     Physical Exam   Constitutional: She is oriented to person, place, and time  She appears well-developed and well-nourished  HENT:   Head: Normocephalic and atraumatic  Neck: Normal range of motion   Neck supple  Cardiovascular: Normal rate and regular rhythm  Pulmonary/Chest: Effort normal and breath sounds normal  No stridor  No respiratory distress  She has no wheezes  Lymphadenopathy:     She has no cervical adenopathy  Neurological: She is alert and oriented to person, place, and time  She displays normal reflexes  No cranial nerve deficit or sensory deficit  She exhibits normal muscle tone   Coordination normal

## 2020-02-19 ENCOUNTER — NURSE TRIAGE (OUTPATIENT)
Dept: OTHER | Facility: OTHER | Age: 20
End: 2020-02-19

## 2020-02-19 NOTE — TELEPHONE ENCOUNTER
Reason for Disposition   [1] MODERATE back pain (e g , interferes with normal activities) AND [2] present > 3 days    Answer Assessment - Initial Assessment Questions  1  ONSET: "When did the pain begin?"       A week ago   2  LOCATION: "Where does it hurt?" (upper, mid or lower back)      Lower back   3  SEVERITY: "How bad is the pain?"  (e g , Scale 1-10; mild, moderate, or severe)    - MILD (1-3): doesn't interfere with normal activities     - MODERATE (4-7): interferes with normal activities or awakens from sleep     - SEVERE (8-10): excruciating pain, unable to do any normal activities      Mild pain - 3/10     4  PATTERN: "Is the pain constant?" (e g , yes, no; constant, intermittent)      It is a constant pain  5  RADIATION: "Does the pain shoot into your legs or elsewhere?"      No     8  MEDICATIONS: "What have you taken so far for the pain?" (e g , nothing, acetaminophen, NSAIDS)      *No Answer*  9  NEUROLOGIC SYMPTOMS: "Do you have any weakness, numbness, or problems with bowel/bladder control?"      She has not tried any medication at all yet  She is only using a heating pad      Protocols used: BACK PAIN-ADULT-

## 2020-02-19 NOTE — TELEPHONE ENCOUNTER
Regarding: Appointment Request  ----- Message from Beverly Daniel sent at 2/19/2020  7:21 AM EST -----  "I would like to schedule an appointment, I have been having back pain "

## 2021-05-20 NOTE — PATIENT INSTRUCTIONS
headache  Follow up with PCP in 3-5 days  Proceed to  ER if symptoms worsen  Rhinosinusitis   WHAT YOU NEED TO KNOW:   Rhinosinusitis (RS) is inflammation of your nose and sinuses  It commonly begins as a virus, often as a common cold  Viruses usually last 7 to 10 days and do not need treatment  When the virus does not get better on its own, you may have bacterial RS  This means that bacteria have begun to grow inside your sinuses  Acute RS lasts less than 4 weeks  Chronic RS lasts 12 weeks or more  Recurrent RS is when you have 4 or more episodes of RS in one year  DISCHARGE INSTRUCTIONS:   Return to the emergency department if:   · Your eye and eyelid are red, swollen, and painful  · You cannot open your eye  · You have double vision or you cannot see  · Your eyeball bulges out or you cannot move your eye  · You are more sleepy than normal or you notice changes in your ability to think, move, or talk  · You have a stiff neck, a fever, or a bad headache  · You have swelling of your forehead or scalp  Contact your healthcare provider if:   · Your symptoms are worse or do not improve after 3 to 5 days of treatment  · You have questions or concerns about your condition or care  Medicines: You may need any of the following:  · Acetaminophen  decreases pain and fever  It is available without a doctor's order  Ask how much to take and how often to take it  Follow directions  Acetaminophen can cause liver damage if not taken correctly  · NSAIDs , such as ibuprofen, help decrease swelling, pain, and fever  This medicine is available with or without a doctor's order  NSAIDs can cause stomach bleeding or kidney problems in certain people  If you take blood thinner medicine, always ask your healthcare provider if NSAIDs are safe for you  Always read the medicine label and follow directions  · Nasal steroid sprays  decrease inflammation in your nose and sinuses      · Decongestants reduce swelling and drain mucus in the nose and sinuses  They may help you breathe easier  · Antihistamines  dry mucus in the nose and relieve sneezing  · Antibiotics  treat a bacterial infection and may be needed if your symptoms do not improve or they get worse  · Take your medicine as directed  Contact your healthcare provider if you think your medicine is not helping or if you have side effects  Tell him or her if you are allergic to any medicine  Keep a list of the medicines, vitamins, and herbs you take  Include the amounts, and when and why you take them  Bring the list or the pill bottles to follow-up visits  Carry your medicine list with you in case of an emergency  Self-care:   · Rinse your sinuses  Use a sinus rinse device to rinse your nasal passages with a saline (salt water) solution  This will help thin the mucus in your nose and rinse away pollen and dirt  It will also help reduce swelling so you can breathe normally  Ask your healthcare provider how often to do this  · Breathe in steam   Heat a bowl of water until you see steam  Lean over the bowl and make a tent over your head with a large towel  Breathe deeply for about 20 minutes  Be careful not to get too close to the steam or burn yourself  Do this 3 times a day  You can also breathe deeply when you take a hot shower  · Sleep with your head elevated  Place an extra pillow under your head before you go to sleep to help your sinuses drain  · Drink liquids as directed  Ask your healthcare provider how much liquid to drink each day and which liquids are best for you  Liquids will thin the mucus in your nose and help it drain  Avoid drinks that contain alcohol or caffeine  · Do not smoke, and avoid secondhand smoke  Nicotine and other chemicals in cigarettes and cigars can make your symptoms worse  Ask your healthcare provider for information if you currently smoke and need help to quit   E-cigarettes or smokeless How Severe Is It?: mild tobacco still contain nicotine  Talk to your healthcare provider before you use these products  Follow up with your healthcare provider as directed: Follow up if your symptoms are worse or not better after 3 to 5 days of treatment  Write down your questions so you remember to ask them during your visits  © 2017 [employer] Information is for End User's use only and may not be sold, redistributed or otherwise used for commercial purposes  All illustrations and images included in CareNotes® are the copyrighted property of [employer]  or [employer]  The above information is an  only  It is not intended as medical advice for individual conditions or treatments  Talk to your doctor, nurse or pharmacist before following any medical regimen to see if it is safe and effective for you  Is This A New Presentation, Or A Follow-Up?: Follow Up Subacute Cutaneous Lupus Erythematosus

## 2022-11-03 ENCOUNTER — OFFICE VISIT (OUTPATIENT)
Dept: FAMILY MEDICINE CLINIC | Facility: CLINIC | Age: 22
End: 2022-11-03

## 2022-11-03 VITALS
SYSTOLIC BLOOD PRESSURE: 128 MMHG | BODY MASS INDEX: 40.16 KG/M2 | OXYGEN SATURATION: 98 % | HEIGHT: 68 IN | HEART RATE: 90 BPM | WEIGHT: 265 LBS | TEMPERATURE: 97.1 F | DIASTOLIC BLOOD PRESSURE: 80 MMHG

## 2022-11-03 DIAGNOSIS — Z13.220 SCREENING CHOLESTEROL LEVEL: ICD-10-CM

## 2022-11-03 DIAGNOSIS — F32.A DEPRESSION, UNSPECIFIED DEPRESSION TYPE: ICD-10-CM

## 2022-11-03 DIAGNOSIS — Z11.59 NEED FOR HEPATITIS C SCREENING TEST: ICD-10-CM

## 2022-11-03 DIAGNOSIS — Z00.00 WELL ADULT EXAM: Primary | ICD-10-CM

## 2022-11-03 DIAGNOSIS — G43.909 MIGRAINE WITHOUT STATUS MIGRAINOSUS, NOT INTRACTABLE, UNSPECIFIED MIGRAINE TYPE: ICD-10-CM

## 2022-11-03 DIAGNOSIS — M25.532 WRIST PAIN, ACUTE, LEFT: ICD-10-CM

## 2022-11-03 DIAGNOSIS — Z11.4 SCREENING FOR HIV (HUMAN IMMUNODEFICIENCY VIRUS): ICD-10-CM

## 2022-11-03 DIAGNOSIS — L98.9 SCALP LESION: ICD-10-CM

## 2022-11-03 DIAGNOSIS — R53.83 OTHER FATIGUE: ICD-10-CM

## 2022-11-03 RX ORDER — DICYCLOMINE HCL 20 MG
20 TABLET ORAL EVERY 6 HOURS
COMMUNITY
Start: 2021-11-24 | End: 2022-11-24

## 2022-11-03 RX ORDER — VENLAFAXINE HYDROCHLORIDE 150 MG/1
150 CAPSULE, EXTENDED RELEASE ORAL DAILY
COMMUNITY
Start: 2022-10-17

## 2022-11-03 RX ORDER — PROPRANOLOL HYDROCHLORIDE 160 MG/1
160 CAPSULE, EXTENDED RELEASE ORAL DAILY
COMMUNITY
Start: 2022-10-18

## 2022-11-03 RX ORDER — CYCLOBENZAPRINE HCL 10 MG
TABLET ORAL
COMMUNITY
Start: 2022-10-22 | End: 2022-11-03

## 2022-11-03 RX ORDER — SERTRALINE HYDROCHLORIDE 25 MG/1
TABLET, FILM COATED ORAL
COMMUNITY
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
## 2022-11-07 ENCOUNTER — APPOINTMENT (OUTPATIENT)
Dept: LAB | Facility: MEDICAL CENTER | Age: 22
End: 2022-11-07

## 2022-11-07 ENCOUNTER — APPOINTMENT (OUTPATIENT)
Dept: RADIOLOGY | Facility: MEDICAL CENTER | Age: 22
End: 2022-11-07

## 2022-11-07 DIAGNOSIS — Z13.220 SCREENING CHOLESTEROL LEVEL: ICD-10-CM

## 2022-11-07 DIAGNOSIS — M25.532 WRIST PAIN, ACUTE, LEFT: ICD-10-CM

## 2022-11-07 DIAGNOSIS — R53.83 OTHER FATIGUE: ICD-10-CM

## 2022-11-07 DIAGNOSIS — Z11.59 NEED FOR HEPATITIS C SCREENING TEST: ICD-10-CM

## 2022-11-07 LAB
ALBUMIN SERPL BCP-MCNC: 3.7 G/DL (ref 3.5–5)
ALP SERPL-CCNC: 60 U/L (ref 46–116)
ALT SERPL W P-5'-P-CCNC: 28 U/L (ref 12–78)
ANION GAP SERPL CALCULATED.3IONS-SCNC: 8 MMOL/L (ref 4–13)
AST SERPL W P-5'-P-CCNC: 12 U/L (ref 5–45)
BACTERIA UR QL AUTO: ABNORMAL /HPF
BASOPHILS # BLD AUTO: 0.07 THOUSANDS/ÂΜL (ref 0–0.1)
BASOPHILS NFR BLD AUTO: 1 % (ref 0–1)
BILIRUB SERPL-MCNC: 1.02 MG/DL (ref 0.2–1)
BILIRUB UR QL STRIP: NEGATIVE
BUN SERPL-MCNC: 9 MG/DL (ref 5–25)
CALCIUM SERPL-MCNC: 9.4 MG/DL (ref 8.3–10.1)
CHLORIDE SERPL-SCNC: 106 MMOL/L (ref 96–108)
CHOLEST SERPL-MCNC: 207 MG/DL
CLARITY UR: CLEAR
CO2 SERPL-SCNC: 24 MMOL/L (ref 21–32)
COLOR UR: YELLOW
CREAT SERPL-MCNC: 0.79 MG/DL (ref 0.6–1.3)
EOSINOPHIL # BLD AUTO: 0.24 THOUSAND/ÂΜL (ref 0–0.61)
EOSINOPHIL NFR BLD AUTO: 3 % (ref 0–6)
ERYTHROCYTE [DISTWIDTH] IN BLOOD BY AUTOMATED COUNT: 11.3 % (ref 11.6–15.1)
GFR SERPL CREATININE-BSD FRML MDRD: 105 ML/MIN/1.73SQ M
GLUCOSE P FAST SERPL-MCNC: 97 MG/DL (ref 65–99)
GLUCOSE UR STRIP-MCNC: NEGATIVE MG/DL
HCT VFR BLD AUTO: 44.5 % (ref 34.8–46.1)
HDLC SERPL-MCNC: 40 MG/DL
HGB BLD-MCNC: 14.9 G/DL (ref 11.5–15.4)
HGB UR QL STRIP.AUTO: NEGATIVE
IMM GRANULOCYTES # BLD AUTO: 0.02 THOUSAND/UL (ref 0–0.2)
IMM GRANULOCYTES NFR BLD AUTO: 0 % (ref 0–2)
KETONES UR STRIP-MCNC: NEGATIVE MG/DL
LDLC SERPL CALC-MCNC: 129 MG/DL (ref 0–100)
LEUKOCYTE ESTERASE UR QL STRIP: ABNORMAL
LYMPHOCYTES # BLD AUTO: 2.13 THOUSANDS/ÂΜL (ref 0.6–4.47)
LYMPHOCYTES NFR BLD AUTO: 24 % (ref 14–44)
MAGNESIUM SERPL-MCNC: 2.2 MG/DL (ref 1.6–2.6)
MCH RBC QN AUTO: 30.2 PG (ref 26.8–34.3)
MCHC RBC AUTO-ENTMCNC: 33.5 G/DL (ref 31.4–37.4)
MCV RBC AUTO: 90 FL (ref 82–98)
MONOCYTES # BLD AUTO: 0.46 THOUSAND/ÂΜL (ref 0.17–1.22)
MONOCYTES NFR BLD AUTO: 5 % (ref 4–12)
MUCOUS THREADS UR QL AUTO: ABNORMAL
NEUTROPHILS # BLD AUTO: 6 THOUSANDS/ÂΜL (ref 1.85–7.62)
NEUTS SEG NFR BLD AUTO: 67 % (ref 43–75)
NITRITE UR QL STRIP: NEGATIVE
NON-SQ EPI CELLS URNS QL MICRO: ABNORMAL /HPF
NRBC BLD AUTO-RTO: 0 /100 WBCS
PH UR STRIP.AUTO: 6.5 [PH]
PLATELET # BLD AUTO: 361 THOUSANDS/UL (ref 149–390)
PMV BLD AUTO: 10.4 FL (ref 8.9–12.7)
POTASSIUM SERPL-SCNC: 4.1 MMOL/L (ref 3.5–5.3)
PROT SERPL-MCNC: 7.9 G/DL (ref 6.4–8.4)
PROT UR STRIP-MCNC: ABNORMAL MG/DL
RBC # BLD AUTO: 4.93 MILLION/UL (ref 3.81–5.12)
RBC #/AREA URNS AUTO: ABNORMAL /HPF
SODIUM SERPL-SCNC: 138 MMOL/L (ref 135–147)
SP GR UR STRIP.AUTO: 1.02 (ref 1–1.03)
T4 FREE SERPL-MCNC: 1.12 NG/DL (ref 0.76–1.46)
TRIGL SERPL-MCNC: 191 MG/DL
TSH SERPL DL<=0.05 MIU/L-ACNC: 2.2 UIU/ML (ref 0.45–4.5)
URATE SERPL-MCNC: 6.6 MG/DL (ref 2–7.5)
UROBILINOGEN UR STRIP-ACNC: <2 MG/DL
WBC # BLD AUTO: 8.92 THOUSAND/UL (ref 4.31–10.16)
WBC #/AREA URNS AUTO: ABNORMAL /HPF

## 2022-11-08 LAB
HCV AB SER QL: NORMAL
HIV 1+2 AB+HIV1 P24 AG SERPL QL IA: NORMAL

## 2022-11-10 ENCOUNTER — CONSULT (OUTPATIENT)
Dept: SURGERY | Facility: CLINIC | Age: 22
End: 2022-11-10

## 2022-11-10 VITALS
DIASTOLIC BLOOD PRESSURE: 86 MMHG | SYSTOLIC BLOOD PRESSURE: 112 MMHG | HEART RATE: 79 BPM | BODY MASS INDEX: 40.16 KG/M2 | HEIGHT: 68 IN | WEIGHT: 265 LBS

## 2022-11-10 DIAGNOSIS — L72.11 PILAR CYST OF SCALP: Primary | ICD-10-CM

## 2022-11-10 NOTE — PROGRESS NOTES
Assessment/Plan:     Diagnoses and all orders for this visit:    Pilar cyst of scalp      x 4    For excision in office in 2 settings    Subjective:      Patient ID: Rosalinda Jaramillo is a 23 y o  female  HPI   Patient has 4 pilar cysts on the scalp which she would like removed  The following portions of the patient's history were reviewed and updated as appropriate: allergies, current medications, past family history, past medical history, past social history, past surgical history, and problem list     Review of Systems    Bronchial asthma  Depression    Objective:    /86   Pulse 79   Ht 5' 8" (1 727 m)   Wt 120 kg (265 lb)   BMI 40 29 kg/m²      Physical Exam    Four Pilar cysts on the scalp varying in size from 1-2 cm  Three are in the frontal area and 1 is on the occipital area

## 2022-11-17 NOTE — PROGRESS NOTES
Name: Angeles Bassett      : 2000      MRN: [id number]  Encounter Provider: Brayan Espino MD  Encounter Date: 2022   Encounter department: [address] [place] [address] Place     1  Anxiety  Assessment & Plan:  Patient to continue utilizing medical therapy as well as counseling sources as applicable to condition  If suicidal thought or fear of suicide attempt, to call 911 and seek help immediately  Medications and therapy reviewed today and all patient  questions answered today  2  Depression, unspecified depression type  Assessment & Plan:  Patient to continue utilizing medical therapy as well and counseling sources as applicable for condition  If  suicidal thought or fear of suicide to contact 911 and seek help immediately  Meds reviewed and patient questions answered today      3  Migraine without status migrainosus, not intractable, unspecified migraine type  Assessment & Plan:  Patient is stable  and will continue present plan of care and reassess at next routine visit  All questions about this problem from patient were answered today  4  Exercise-induced bronchospasm  Assessment & Plan:  Patient is stable  and will continue present plan of care and reassess at next routine visit  All questions about this problem from patient were answered today  5  Pure hypercholesterolemia  -     Comprehensive metabolic panel; Future  -     Lipid Panel with Direct LDL reflex; Future      BMI Counseling: There is no height or weight on file to calculate BMI  The BMI is above normal  Nutrition recommendations include decreasing portion sizes, encouraging healthy choices of fruits and vegetables, decreasing fast food intake, consuming healthier snacks, limiting drinks that contain sugar, moderation in carbohydrate intake, increasing intake of lean protein, reducing intake of saturated and trans fat and reducing intake of cholesterol   Exercise recommendations include exercising 3-5 times per week  No pharmacotherapy was ordered  Patient referred to PCP  Rationale for BMI follow-up plan is due to patient being overweight or obese  Depression Screening and Follow-up Plan: Patient assessed for underlying major depression  Brief counseling provided and recommend additional follow-up/re-evaluation next office visit  Patient advised to follow-up with PCP for further management  Subjective     A 40-year-old female here today for her yearly physical exam as well as discussion of her recent labs and x-rays  Patient with no arthritis in her hand or her wrist but still has a bit tenderness which is suggestive of a tendinitis  Patient also had lab work done which showed she had high cholesterol at [address] rest her lab work was pretty unremarkable discussed with her at length about a low-fat diet gave her information on low-fat diet I will see her back in 3 months to recheck her cholesterol      Review of Systems    Past Medical History:   Diagnosis Date   • Anxiety    • Asthma    • Depression    • Exercise induced bronchospasm    • Migraine    • Migraines      Past Surgical History:   Procedure Laterality Date   • MOUTH SURGERY      Oral surgery procedure     Family History   Problem Relation Age of Onset   • Hypertension Mother    • Hyperlipidemia Mother    • Asthma Mother    • Anxiety disorder Mother    • Depression Mother    • Migraines Mother    • Hypertension Father    • Arthritis Father    • Asthma Father    • Diabetes Father    • Anxiety disorder Sister    • Depression Sister    • Endometriosis Sister    • Depression Maternal Grandmother    • Migraines Maternal Grandmother    • Arthritis Paternal Grandfather    • Diabetes Paternal Grandfather    • Endometriosis Paternal Aunt      Social History     Socioeconomic History   • Marital status: Single     Spouse name: None   • Number of children: None   • Years of education: None   • Highest education level: None   Occupational History   • None   Tobacco Use   • Smoking status: Never   • Smokeless tobacco: Never   Vaping Use   • Vaping Use: Never used   Substance and Sexual Activity   • Alcohol use: Yes     Comment: occasionally   • Drug use: Never   • Sexual activity: Yes     Partners: Male     Birth control/protection: Implant   Other Topics Concern   • None   Social History Narrative    Checked [place]     Social Determinants of Health     Financial Resource Strain: Not on file   Food Insecurity: Not on file   Transportation Needs: Not on file   Physical Activity: Not on file   Stress: Not on file   Social Connections: Not on file   Intimate Partner Violence: Not on file   Housing Stability: Not on file     Current Outpatient Medications on File Prior to Visit   Medication Sig   • albuterol (PROVENTIL HFA,VENTOLIN HFA) 90 mcg/act inhaler 1-2 puffs q6 hrs PRN   • dicyclomine (BENTYL) 20 mg tablet Take 20 mg by mouth every 6 (six) hours   • etonogestrel (NEXPLANON) subdermal implant To be placed at our office     • ondansetron (ZOFRAN) 4 mg tablet take 1 tablet by mouth every 8 hours if needed for nausea   • propranolol (INDERAL LA) 160 mg Take 160 mg by mouth daily   • sertraline (ZOLOFT) 25 mg tablet    • SUMAtriptan (IMITREX) 100 mg tablet Take 1 tablet (100 mg total) by mouth once as needed for migraine for up to 1 dose   • venlafaxine (EFFEXOR-XR) 150 mg 24 hr capsule Take 150 mg by mouth daily     Allergies   Allergen Reactions   • Iodine - Food Allergy Shortness Of Breath   • Lavender Oil Itching     Immunization History   Administered Date(s) Administered   • COVID-19 PFIZER VACCINE 0 3 ML IM 06/02/2021, 06/23/2021   • DTaP 2000, 2000, 03/17/1999, 04/17/2000, 10/08/2002   • HPV Quadrivalent 10/03/2014   • HPV9 09/20/2019   • Hep B, Adolescent or Pediatric 2000, 2000, 03/17/1999   • Hib (PRP-D) 2000, 1998, 01/12/2000, 06/12/2000   • INFLUENZA 10/14/2015, 11/18/2016, 10/02/2017   • IPV 2000, 2000, 03/17/1999, 04/17/2000   • Influenza Split 11/19/2008, 11/02/2009   • Influenza, seasonal, injectable 10/14/2015   • MMR 01/12/2000, 10/08/2002   • Meningococcal Conjugate (MCV4O) 08/03/2017   • Meningococcal MCV4P 08/03/2017   • Meningococcal Polysaccharide (MPSV4) 08/12/2011   • Pneumococcal Conjugate 13-Valent 11/07/2000   • TD (adult) Preservative Free 08/12/2011   • Varicella 10/06/1999, 08/12/2011       Objective     /80 (BP Location: Left arm, Patient Position: Sitting, Cuff Size: Large)   Pulse 79   Temp 98 2 °F (36 8 °C) (Temporal)   Ht 5' 8" (1 727 m)   Wt 121 kg (267 lb)   SpO2 98%   BMI 40 60 kg/m²     Physical Exam  Michaelle Sandhu MD

## 2022-11-18 ENCOUNTER — TELEPHONE (OUTPATIENT)
Dept: FAMILY MEDICINE CLINIC | Facility: CLINIC | Age: 22
End: 2022-11-18

## 2022-11-18 ENCOUNTER — OFFICE VISIT (OUTPATIENT)
Dept: FAMILY MEDICINE CLINIC | Facility: CLINIC | Age: 22
End: 2022-11-18

## 2022-11-18 VITALS
SYSTOLIC BLOOD PRESSURE: 136 MMHG | TEMPERATURE: 98.2 F | DIASTOLIC BLOOD PRESSURE: 80 MMHG | HEIGHT: 68 IN | HEART RATE: 79 BPM | WEIGHT: 267 LBS | OXYGEN SATURATION: 98 % | BODY MASS INDEX: 40.47 KG/M2

## 2022-11-18 DIAGNOSIS — J45.990 EXERCISE-INDUCED BRONCHOSPASM: ICD-10-CM

## 2022-11-18 DIAGNOSIS — G43.909 MIGRAINE WITHOUT STATUS MIGRAINOSUS, NOT INTRACTABLE, UNSPECIFIED MIGRAINE TYPE: ICD-10-CM

## 2022-11-18 DIAGNOSIS — F41.9 ANXIETY: Primary | ICD-10-CM

## 2022-11-18 DIAGNOSIS — R41.3 MEMORY DEFICIT: Primary | ICD-10-CM

## 2022-11-18 DIAGNOSIS — E78.00 PURE HYPERCHOLESTEROLEMIA: ICD-10-CM

## 2022-11-18 DIAGNOSIS — F32.A DEPRESSION, UNSPECIFIED DEPRESSION TYPE: ICD-10-CM

## 2022-11-18 NOTE — TELEPHONE ENCOUNTER
I will add a folate and B12 level and see if these are normal  We can address further at her upcoming OV or she can schedule earlier to come in

## 2022-11-18 NOTE — TELEPHONE ENCOUNTER
Patient forgot to ask you during her office visit about her memory issues  Since her thyroid was normal she wants to know what the next step would be

## 2022-11-18 NOTE — TELEPHONE ENCOUNTER
I will add  a folate and Vit B12 level and if these are normal, we can address further at her upcoming OV or she can schedule earlier

## 2022-12-12 ENCOUNTER — OFFICE VISIT (OUTPATIENT)
Dept: URGENT CARE | Facility: MEDICAL CENTER | Age: 22
End: 2022-12-12

## 2022-12-12 VITALS
TEMPERATURE: 98.1 F | SYSTOLIC BLOOD PRESSURE: 129 MMHG | HEART RATE: 129 BPM | OXYGEN SATURATION: 98 % | DIASTOLIC BLOOD PRESSURE: 80 MMHG | RESPIRATION RATE: 18 BRPM

## 2022-12-12 DIAGNOSIS — R68.89 FLU-LIKE SYMPTOMS: Primary | ICD-10-CM

## 2022-12-12 RX ORDER — BROMPHENIRAMINE MALEATE, PSEUDOEPHEDRINE HYDROCHLORIDE, AND DEXTROMETHORPHAN HYDROBROMIDE 2; 30; 10 MG/5ML; MG/5ML; MG/5ML
5 SYRUP ORAL 4 TIMES DAILY PRN
Qty: 120 ML | Refills: 0 | Status: SHIPPED | OUTPATIENT
Start: 2022-12-12 | End: 2022-12-17

## 2022-12-12 NOTE — PATIENT INSTRUCTIONS
Flu-like symptoms   COVID test sent  Bromfed as needed for cough/congestion  Follow up with PCP in 3-5 days  Proceed to  ER if symptoms worsen  [address]    Your healthcare provider and/or public health staff have evaluated you and have determined that you do not need to remain in the hospital at this time  At this time you can be isolated at home where you will be monitored by staff from your local or state health department  You should carefully follow the prevention and isolation steps below until a healthcare provider or local or state health department says that you can return to your normal activities  Stay home except to get medical care    People who are mildly ill with COVID-19 are able to isolate at home during their illness  You should restrict activities outside your home, except for getting medical care  Do not go to work, school, or public areas  Avoid using public transportation, ride-sharing, or taxis  Separate yourself from other people and animals in your home    People: As much as possible, you should stay in a specific room and away from other people in your home  Also, you should use a separate bathroom, if available  Animals: You should restrict contact with pets and other animals while you are sick with COVID-19, just like you would around other people  Although there have not been reports of pets or other animals becoming sick with COVID-19, it is still recommended that people sick with COVID-19 limit contact with animals until more information is known about the virus  When possible, have another member of your household care for your animals while you are sick  If you are sick with COVID-19, avoid contact with your pet, including petting, snuggling, being kissed or licked, and sharing food  If you must care for your pet or be around animals while you are sick, wash your hands before and after you interact with pets and wear a facemask   See COVID-19 and Animals for more information  Call ahead before visiting your doctor    If you have a medical appointment, call the healthcare provider and tell them that you have or may have COVID-19  This will help the healthcare provider’s office take steps to keep other people from getting infected or exposed  Wear a facemask    You should wear a facemask when you are around other people (e g , sharing a room or vehicle) or pets and before you enter a healthcare provider’s office  If you are not able to wear a facemask (for example, because it causes trouble breathing), then people who live with you should not stay in the same room with you, or they should wear a facemask if they enter your room  Cover your coughs and sneezes    Cover your mouth and nose with a tissue when you cough or sneeze  Throw used tissues in a lined trash can  Immediately wash your hands with soap and water for at least 20 seconds or, if soap and water are not available, clean your hands with an alcohol-based hand  that contains at least 60% alcohol  Clean your hands often    Wash your hands often with soap and water for at least 20 seconds, especially after blowing your nose, coughing, or sneezing; going to the bathroom; and before eating or preparing food  If soap and water are not readily available, use an alcohol-based hand  with at least 60% alcohol, covering all surfaces of your hands and rubbing them together until they feel dry  Soap and water are the best option if hands are visibly dirty  Avoid touching your eyes, nose, and mouth with unwashed hands  Avoid sharing personal household items    You should not share dishes, drinking glasses, cups, eating utensils, towels, or bedding with other people or pets in your home  After using these items, they should be washed thoroughly with soap and water      Clean all “high-touch” surfaces everyday    High touch surfaces include counters, tabletops, doorknobs, bathroom fixtures, toilets, phones, keyboards, tablets, and bedside tables  Also, clean any surfaces that may have blood, stool, or body fluids on them  Use a household cleaning spray or wipe, according to the label instructions  Labels contain instructions for safe and effective use of the cleaning product including precautions you should take when applying the product, such as wearing gloves and making sure you have good ventilation during use of the product  Monitor your symptoms    Seek prompt medical attention if your illness is worsening (e g , difficulty breathing)  Before seeking care, call your healthcare provider and tell them that you have, or are being evaluated for, COVID-19  Put on a facemask before you enter the facility  These steps will help the healthcare provider’s office to keep other people in the office or waiting room from getting infected or exposed  Ask your healthcare provider to call the local or [facility] health department  Persons who are placed under active monitoring or facilitated self-monitoring should follow instructions provided by their local health department or occupational health professionals, as appropriate  If you have a medical emergency and need to call 911, notify the dispatch personnel that you have, or are being evaluated for COVID-19  If possible, put on a facemask before emergency medical services arrive  Discontinuing home isolation    Patients with confirmed COVID-19 should remain under home isolation precautions until the following conditions are met:    They have had no fever for at least 24 hours (that is one full day of no fever without the use medicine that reduces fevers)  AND  other symptoms have improved (for example, when their cough or shortness of breath have improved)  AND  If had mild or moderate illness, at least 10 days have passed since their symptoms first appeared or if severe illness (needed oxygen) or immunosuppressed, at least 20 days have passed since symptoms first appeared  Patients with confirmed COVID-19 should also notify close contacts (including their workplace) and ask that they self-quarantine  Currently, close contact is defined as being within 6 feet for 15 minutes or more from the period 24 hours starting 48 hours before symptom onset to the time at which the patient went into isolation  Close contacts of patients diagnosed with COVID-19 should be instructed by the patient to self-quarantine for 14 days from the last time of their last contact with the patient       Source: [employer]

## 2022-12-12 NOTE — PROGRESS NOTES
[facility]        NAME: Myke Arias is a 23 y o  female  : 2000    MRN: [id number]  DATE: 2022  TIME: 2:28 PM    Assessment and Plan   Flu-like symptoms [R68 89]  1  Flu-like symptoms  Covid/Flu-Office Collect    brompheniramine-pseudoephedrine-DM 30-2-10 MG/5ML syrup            Patient Instructions     Flu-like symptoms   COVID test sent  Bromfed as needed for cough/congestion  Follow up with PCP in 3-5 days  Proceed to  ER if symptoms worsen  Chief Complaint     Chief Complaint   Patient presents with   • Headache     Headache, sore throat, not much coughing, fatigue, congestion  Started Saturday night  History of Present Illness       64-year-old female who presents complaining of cough, congestion, headaches, body aches x3 days  Denies chest pain, shortness off breath  States that her roommate had similar symptoms but never tested for either COVID or flu      Review of Systems   Review of Systems   Constitutional: Positive for fatigue  Negative for activity change, appetite change, chills, diaphoresis and fever  HENT: Positive for congestion and rhinorrhea  Negative for ear discharge, ear pain, facial swelling, hearing loss, mouth sores, nosebleeds, postnasal drip, sinus pressure, sinus pain, sneezing, sore throat and voice change  Respiratory: Positive for cough  Negative for apnea, choking, chest tightness, shortness of breath, wheezing and stridor  Cardiovascular: Negative            Current Medications       Current Outpatient Medications:   •  albuterol (PROVENTIL HFA,VENTOLIN HFA) 90 mcg/act inhaler, 1-2 puffs q6 hrs PRN, Disp: , Rfl:   •  brompheniramine-pseudoephedrine-DM 30-2-10 MG/5ML syrup, Take 5 mL by mouth 4 (four) times a day as needed for congestion or cough for up to 5 days, Disp: 120 mL, Rfl: 0  •  ondansetron (ZOFRAN) 4 mg tablet, take 1 tablet by mouth every 8 hours if needed for nausea, Disp: , Rfl:   •  propranolol (INDERAL LA) 160 mg, Take 160 mg by mouth daily, Disp: , Rfl:   •  sertraline (ZOLOFT) 25 mg tablet, , Disp: , Rfl:   •  SUMAtriptan (IMITREX) 100 mg tablet, Take 1 tablet (100 mg total) by mouth once as needed for migraine for up to 1 dose, Disp: 9 tablet, Rfl: 1  •  venlafaxine (EFFEXOR-XR) 150 mg 24 hr capsule, Take 150 mg by mouth daily, Disp: , Rfl:   •  dicyclomine (BENTYL) 20 mg tablet, Take 20 mg by mouth every 6 (six) hours, Disp: , Rfl:   •  etonogestrel (NEXPLANON) subdermal implant, To be placed at our office  , Disp: , Rfl:     Current Allergies     Allergies as of 12/12/2022 - Reviewed 12/12/2022   Allergen Reaction Noted   • Iodine - food allergy Shortness Of Breath 05/01/2021   • Lavender oil Itching 05/27/2020            The following portions of the patient's history were reviewed and updated as appropriate: allergies, current medications, past family history, past medical history, past social history, past surgical history and problem list      Past Medical History:   Diagnosis Date   • Anxiety    • Asthma    • Depression    • Exercise induced bronchospasm    • Migraine    • Migraines        Past Surgical History:   Procedure Laterality Date   • MOUTH SURGERY      Oral surgery procedure       Family History   Problem Relation Age of Onset   • Hypertension Mother    • Hyperlipidemia Mother    • Asthma Mother    • Anxiety disorder Mother    • Depression Mother    • Migraines Mother    • Hypertension Father    • Arthritis Father    • Asthma Father    • Diabetes Father    • Anxiety disorder Sister    • Depression Sister    • Endometriosis Sister    • Depression Maternal Grandmother    • Migraines Maternal Grandmother    • Arthritis Paternal Grandfather    • Diabetes Paternal Grandfather    • Endometriosis Paternal Aunt          Medications have been verified          Objective   /80   Pulse (!) 129   Temp 98 1 °F (36 7 °C)   Resp 18   SpO2 98%        Physical Exam     Physical Exam  Constitutional:       General: She is not in acute distress  Appearance: She is well-developed  She is not diaphoretic  HENT:      Head: Normocephalic and atraumatic  Right Ear: Hearing, tympanic membrane, ear canal and external ear normal       Left Ear: Hearing, tympanic membrane, ear canal and external ear normal       Nose: Rhinorrhea present  Mouth/Throat:      Pharynx: Uvula midline  Cardiovascular:      Rate and Rhythm: Normal rate and regular rhythm  Heart sounds: Normal heart sounds  Pulmonary:      Effort: Pulmonary effort is normal       Breath sounds: Normal breath sounds  Musculoskeletal:      Cervical back: Normal range of motion and neck supple  Lymphadenopathy:      Cervical: Cervical adenopathy present

## 2022-12-13 LAB
FLUAV RNA RESP QL NAA+PROBE: NEGATIVE
FLUBV RNA RESP QL NAA+PROBE: NEGATIVE
SARS-COV-2 RNA RESP QL NAA+PROBE: NEGATIVE

## 2022-12-20 ENCOUNTER — PROCEDURE VISIT (OUTPATIENT)
Dept: SURGERY | Facility: CLINIC | Age: 22
End: 2022-12-20

## 2022-12-20 VITALS
DIASTOLIC BLOOD PRESSURE: 70 MMHG | HEART RATE: 101 BPM | SYSTOLIC BLOOD PRESSURE: 100 MMHG | BODY MASS INDEX: 40.47 KG/M2 | HEIGHT: 68 IN | WEIGHT: 267 LBS

## 2022-12-20 DIAGNOSIS — L72.11 PILAR CYST OF SCALP: Primary | ICD-10-CM

## 2022-12-20 DIAGNOSIS — L72.11 PILAR CYST OF SCALP: ICD-10-CM

## 2022-12-20 NOTE — PROGRESS NOTES
Procedures     Mass scalp 2 5 cms  (Pilar cysts x 2) Frontal area of scalp    Excision of pilar cysts scalp    LA: 1% Lidocaine with 0 5% Marcain with NaHCO3 x 24 mls    Written consent obtained from patient    Specimens sent to pathology    Incisions closed with interrupted 3 0 Vicryl    Antibiotic ointment applied

## 2022-12-27 ENCOUNTER — OFFICE VISIT (OUTPATIENT)
Dept: SURGERY | Facility: CLINIC | Age: 22
End: 2022-12-27

## 2022-12-27 VITALS
HEIGHT: 68 IN | BODY MASS INDEX: 40.47 KG/M2 | SYSTOLIC BLOOD PRESSURE: 126 MMHG | HEART RATE: 80 BPM | WEIGHT: 267 LBS | DIASTOLIC BLOOD PRESSURE: 84 MMHG

## 2022-12-27 DIAGNOSIS — L72.11 PILAR CYST OF SCALP: Primary | ICD-10-CM

## 2022-12-27 NOTE — PROGRESS NOTES
Assessment/Plan:     Diagnoses and all orders for this visit:    Pilar cyst of scalp      Pathology report checked  See prn    Subjective:      Patient ID: Kathy Barroso is a 23 y o  female  HPI   Patient is 1 week status post excision of 2 pilar cyst from the frontal area of the scalp  Patient had postoperative orbital swelling but this has resolved  There are no complaints  The following portions of the patient's history were reviewed and updated as appropriate: allergies, current medications, past family history, past medical history, past social history, past surgical history, and problem list     Review of Systems    Noncontributory    Objective:    /84   Pulse 80   Ht 5' 8" (1 727 m)   Wt 121 kg (267 lb)   BMI 40 60 kg/m²       Physical Exam    Scalp incisions are well-healed and the sutures were removed

## 2023-01-18 ENCOUNTER — OFFICE VISIT (OUTPATIENT)
Dept: URGENT CARE | Facility: MEDICAL CENTER | Age: 23
End: 2023-01-18

## 2023-01-18 VITALS
HEIGHT: 68 IN | DIASTOLIC BLOOD PRESSURE: 57 MMHG | RESPIRATION RATE: 18 BRPM | SYSTOLIC BLOOD PRESSURE: 104 MMHG | OXYGEN SATURATION: 97 % | WEIGHT: 259 LBS | TEMPERATURE: 98.2 F | HEART RATE: 80 BPM | BODY MASS INDEX: 39.25 KG/M2

## 2023-01-18 DIAGNOSIS — R19.7 DIARRHEA, UNSPECIFIED TYPE: Primary | ICD-10-CM

## 2023-01-18 NOTE — LETTER
January 18, 2023     Patient: Piero Navarro   YOB: 2000   Date of Visit: 1/18/2023       To Whom it May Concern:    Piero Navarro was seen in my clinic on 1/18/2023  She may return to work on 1/21/2023  If you have any questions or concerns, please don't hesitate to call           Sincerely,          [facility]        CC: No Recipients

## 2023-01-18 NOTE — PROGRESS NOTES
[address] [facility]        NAME: Piero Navarro is a 23 y o  female  : 2000    MRN: [id number]  DATE: 2023  TIME: 2:33 PM    Assessment and Plan   Diarrhea, unspecified type [R19 7]  1  Diarrhea, unspecified type              Patient Instructions     Diarrhea  Increase fluid intake  Follow up with PCP in 3-5 days  Proceed to  ER if symptoms worsen  Chief Complaint     Chief Complaint   Patient presents with   • Nausea   • Abdominal Pain     Patient states over the last few days she has had nausea, diarrhea, abdominal cramping; patient needs work note for missing today/yesterday      States she has hx of IBS but no appointment until next month          History of Present Illness       47-year-old female who presents complaining of nausea, watery brown diarrhea (no blood, no mucus)  Patient states that she has history of IBS and has had similar symptoms with exacerbations  Patient denies having eaten any new foods  Denies fevers, chills, vomiting      Review of Systems   Review of Systems   Constitutional: Negative  HENT: Negative  Eyes: Negative  Respiratory: Negative  Negative for cough, chest tightness, shortness of breath, wheezing and stridor  Cardiovascular: Negative  Negative for chest pain, palpitations and leg swelling  Gastrointestinal: Positive for abdominal pain, diarrhea and nausea  Negative for abdominal distention, anal bleeding, blood in stool, constipation, rectal pain and vomiting  Current Medications       Current Outpatient Medications:   •  albuterol (PROVENTIL HFA,VENTOLIN HFA) 90 mcg/act inhaler, 1-2 puffs q6 hrs PRN, Disp: , Rfl:   •  dicyclomine (BENTYL) 20 mg tablet, Take 20 mg by mouth every 6 (six) hours, Disp: , Rfl:   •  etonogestrel (NEXPLANON) subdermal implant, To be placed at our office  , Disp: , Rfl:   •  ondansetron (ZOFRAN) 4 mg tablet, take 1 tablet by mouth every 8 hours if needed for nausea, Disp: , Rfl:   •  propranolol (INDERAL LA) 160 mg, Take 160 mg by mouth daily, Disp: , Rfl:   •  sertraline (ZOLOFT) 25 mg tablet, , Disp: , Rfl:   •  SUMAtriptan (IMITREX) 100 mg tablet, Take 1 tablet (100 mg total) by mouth once as needed for migraine for up to 1 dose, Disp: 9 tablet, Rfl: 1  •  venlafaxine (EFFEXOR-XR) 150 mg 24 hr capsule, Take 150 mg by mouth daily, Disp: , Rfl:     Current Allergies     Allergies as of 01/18/2023 - Reviewed 01/18/2023   Allergen Reaction Noted   • Iodine - food allergy Shortness Of Breath 05/01/2021   • Lavender oil Itching 05/27/2020            The following portions of the patient's history were reviewed and updated as appropriate: allergies, current medications, past family history, past medical history, past social history, past surgical history and problem list      Past Medical History:   Diagnosis Date   • Anxiety    • Asthma    • Depression    • Exercise induced bronchospasm    • Migraine    • Migraines        Past Surgical History:   Procedure Laterality Date   • MOUTH SURGERY      Oral surgery procedure       Family History   Problem Relation Age of Onset   • Hypertension Mother    • Hyperlipidemia Mother    • Asthma Mother    • Anxiety disorder Mother    • Depression Mother    • Migraines Mother    • Hypertension Father    • Arthritis Father    • Asthma Father    • Diabetes Father    • Anxiety disorder Sister    • Depression Sister    • Endometriosis Sister    • Depression Maternal Grandmother    • Migraines Maternal Grandmother    • Arthritis Paternal Grandfather    • Diabetes Paternal Grandfather    • Endometriosis Paternal Aunt          Medications have been verified  Objective   /57   Pulse 80   Temp 98 2 °F (36 8 °C) (Temporal)   Resp 18   Ht 5' 8" (1 727 m)   Wt 117 kg (259 lb)   SpO2 97%   BMI 39 38 kg/m²        Physical Exam     Physical Exam  Constitutional:       Appearance: She is well-developed  HENT:      Head: Normocephalic and atraumatic        Right Ear: External ear normal       Left Ear: External ear normal       Nose: Nose normal       Mouth/Throat:      Pharynx: No oropharyngeal exudate  Cardiovascular:      Rate and Rhythm: Normal rate and regular rhythm  Heart sounds: Normal heart sounds  Pulmonary:      Effort: Pulmonary effort is normal  No respiratory distress  Breath sounds: Normal breath sounds  No wheezing or rales  Chest:      Chest wall: No tenderness  Abdominal:      General: Bowel sounds are normal  There is no distension  Palpations: Abdomen is soft  There is no mass  Tenderness: There is generalized abdominal tenderness  There is no right CVA tenderness, left CVA tenderness, guarding or rebound  Negative signs include Chowdhury's sign, Rovsing's sign, McBurney's sign and psoas sign  Musculoskeletal:      Cervical back: Normal range of motion and neck supple  Lymphadenopathy:      Cervical: No cervical adenopathy

## 2023-02-19 NOTE — PROGRESS NOTES
Name: Justin Griffin      : 2000      MRN: [id number]  Encounter Provider: Larry Ha MD  Encounter Date: 2023   Encounter department: [address] [place] [address] Place     1  Depression, unspecified depression type  Assessment & Plan:  Patient to continue utilizing medical therapy as well and counseling sources as applicable for condition  If  suicidal thought or fear of suicide to contact 911 and seek help immediately  Meds reviewed and patient questions answered today    Orders:  -     venlafaxine (EFFEXOR-XR) 150 mg 24 hr capsule; Take 1 capsule (150 mg total) by mouth daily  -     Ambulatory Referral to Neuropsychology; Future    2  Migraine without status migrainosus, not intractable, unspecified migraine type  Assessment & Plan:  Patient is stable  and will continue present plan of care and reassess at next routine visit  All questions about this problem from patient were answered today  Orders:  -     propranolol (INDERAL LA) 160 mg; Take 1 capsule (160 mg total) by mouth daily    3  Anxiety  Assessment & Plan:  Patient to continue utilizing medical therapy as well as counseling sources as applicable to condition  If suicidal thought or fear of suicide attempt, to call 911 and seek help immediately  Medications and therapy reviewed today and all patient  questions answered today  Orders:  -     Ambulatory Referral to Neuropsychology; Future      BMI Counseling: There is no height or weight on file to calculate BMI  The BMI is above normal  Nutrition recommendations include decreasing portion sizes, encouraging healthy choices of fruits and vegetables, decreasing fast food intake, consuming healthier snacks, limiting drinks that contain sugar, moderation in carbohydrate intake, increasing intake of lean protein, reducing intake of saturated and trans fat and reducing intake of cholesterol  Exercise recommendations include exercising 3-5 times per week   No pharmacotherapy was ordered  Patient referred to PCP  Rationale for BMI follow-up plan is due to patient being overweight or obese  Depression Screening and Follow-up Plan: Patient assessed for underlying major depression  Brief counseling provided and recommend additional follow-up/re-evaluation next office visit  Patient advised to follow-up with PCP for further management  Subjective     20-year-old female today for checkup on multiple medical problems with depression anxiety history of migraines and anxiety  Patient needs refills on her propranolol as well as her Effexor  I discussed with the dangers of running out of the Effexor and not having it and withdrawing from it and she is aware of that  Patient also asked about seeing a counselor for anxiety and I recommend that she talk to her insurance to see who is covered for those services and we may provide referrals for her if she needs them  Patient also states she has been having a lot of problems with focusing for most of her life and with see about being evaluated for ADHD I will have her see somebody from neuropsychology to do a neurologic testing of this problem  Review of Systems   Constitutional: Negative for activity change, appetite change, fatigue and fever  HENT: Negative for congestion, ear pain, postnasal drip, rhinorrhea, sinus pressure, sinus pain, sneezing and sore throat  Eyes: Negative for pain and redness  Respiratory: Negative for apnea, cough, chest tightness, shortness of breath and wheezing  Cardiovascular: Negative for chest pain, palpitations and leg swelling  Gastrointestinal: Negative for abdominal pain, constipation, diarrhea, nausea and vomiting  Endocrine: Negative for cold intolerance and heat intolerance  Genitourinary: Negative for difficulty urinating, dysuria, frequency, hematuria and urgency  Musculoskeletal: Negative for arthralgias, back pain, gait problem and myalgias     Skin: Negative for rash  Neurological: Negative for dizziness, speech difficulty, weakness, numbness and headaches  Hematological: Does not bruise/bleed easily  Psychiatric/Behavioral: Negative for agitation, confusion and hallucinations         Past Medical History:   Diagnosis Date   • Anxiety    • Asthma    • Depression    • Exercise induced bronchospasm    • Migraine    • Migraines      Past Surgical History:   Procedure Laterality Date   • MOUTH SURGERY      Oral surgery procedure     Family History   Problem Relation Age of Onset   • Hypertension Mother    • Hyperlipidemia Mother    • Asthma Mother    • Anxiety disorder Mother    • Depression Mother    • Migraines Mother    • Hypertension Father    • Arthritis Father    • Asthma Father    • Diabetes Father    • Anxiety disorder Sister    • Depression Sister    • Endometriosis Sister    • Depression Maternal Grandmother    • Migraines Maternal Grandmother    • Arthritis Paternal Grandfather    • Diabetes Paternal Grandfather    • Endometriosis Paternal Aunt      Social History     Socioeconomic History   • Marital status: Single     Spouse name: None   • Number of children: None   • Years of education: None   • Highest education level: None   Occupational History   • None   Tobacco Use   • Smoking status: Never   • Smokeless tobacco: Never   Vaping Use   • Vaping Use: Never used   Substance and Sexual Activity   • Alcohol use: Yes     Comment: occasionally   • Drug use: Never   • Sexual activity: Yes     Partners: Male     Birth control/protection: Implant   Other Topics Concern   • None   Social History Narrative    Checked [place]     Social Determinants of Health     Financial Resource Strain: Not on file   Food Insecurity: Not on file   Transportation Needs: Not on file   Physical Activity: Not on file   Stress: Not on file   Social Connections: Not on file   Intimate Partner Violence: Not on file   Housing Stability: Not on file     Current Outpatient Medications on File Prior to Visit   Medication Sig   • albuterol (PROVENTIL HFA,VENTOLIN HFA) 90 mcg/act inhaler 1-2 puffs q6 hrs PRN   • dicyclomine (BENTYL) 20 mg tablet Take 20 mg by mouth every 6 (six) hours   • etonogestrel (NEXPLANON) subdermal implant To be placed at our office     • ondansetron (ZOFRAN) 4 mg tablet take 1 tablet by mouth every 8 hours if needed for nausea   • sertraline (ZOLOFT) 25 mg tablet    • SUMAtriptan (IMITREX) 100 mg tablet Take 1 tablet (100 mg total) by mouth once as needed for migraine for up to 1 dose   • [DISCONTINUED] propranolol (INDERAL LA) 160 mg Take 160 mg by mouth daily   • [DISCONTINUED] venlafaxine (EFFEXOR-XR) 150 mg 24 hr capsule Take 150 mg by mouth daily     Allergies   Allergen Reactions   • Iodine - Food Allergy Shortness Of Breath   • Lavender Oil Itching     Immunization History   Administered Date(s) Administered   • COVID-19 PFIZER VACCINE 0 3 ML IM 06/02/2021, 06/23/2021   • DTaP 2000, 2000, 03/17/1999, 04/17/2000, 10/08/2002   • HPV Quadrivalent 10/03/2014   • HPV9 09/20/2019   • Hep B, Adolescent or Pediatric 2000, 2000, 03/17/1999   • Hib (PRP-D) 1998, 1998, 01/12/2000, 06/12/2000   • INFLUENZA 10/14/2015, 11/18/2016, 10/02/2017   • IPV 2000, 2000, 03/17/1999, 04/17/2000   • Influenza Split 11/19/2008, 11/02/2009   • Influenza, seasonal, injectable 10/14/2015   • MMR 01/12/2000, 10/08/2002   • Meningococcal Conjugate (MCV4O) 08/03/2017   • Meningococcal MCV4P 08/03/2017   • Meningococcal Polysaccharide (MPSV4) 08/12/2011   • Pneumococcal Conjugate 13-Valent 11/07/2000   • TD (adult) Preservative Free 08/12/2011   • Varicella 10/06/1999, 08/12/2011       Objective     /68 (BP Location: Right arm, Patient Position: Sitting, Cuff Size: Large)   Pulse 71   Temp 98 2 °F (36 8 °C)   Ht 5' 8" (1 727 m)   Wt 117 kg (259 lb)   SpO2 97%   BMI 39 38 kg/m²     Physical Exam  Radha Hardy MD

## 2023-02-20 ENCOUNTER — OFFICE VISIT (OUTPATIENT)
Dept: FAMILY MEDICINE CLINIC | Facility: CLINIC | Age: 23
End: 2023-02-20

## 2023-02-20 VITALS
DIASTOLIC BLOOD PRESSURE: 68 MMHG | HEART RATE: 71 BPM | WEIGHT: 259 LBS | BODY MASS INDEX: 39.25 KG/M2 | HEIGHT: 68 IN | TEMPERATURE: 98.2 F | OXYGEN SATURATION: 97 % | SYSTOLIC BLOOD PRESSURE: 108 MMHG

## 2023-02-20 DIAGNOSIS — G43.909 MIGRAINE WITHOUT STATUS MIGRAINOSUS, NOT INTRACTABLE, UNSPECIFIED MIGRAINE TYPE: ICD-10-CM

## 2023-02-20 DIAGNOSIS — F32.A DEPRESSION, UNSPECIFIED DEPRESSION TYPE: Primary | ICD-10-CM

## 2023-02-20 DIAGNOSIS — F41.9 ANXIETY: ICD-10-CM

## 2023-02-20 RX ORDER — PROPRANOLOL HYDROCHLORIDE 160 MG/1
160 CAPSULE, EXTENDED RELEASE ORAL DAILY
Qty: 30 CAPSULE | Refills: 5 | Status: SHIPPED | OUTPATIENT
Start: 2023-02-20

## 2023-02-20 RX ORDER — VENLAFAXINE HYDROCHLORIDE 150 MG/1
150 CAPSULE, EXTENDED RELEASE ORAL DAILY
Qty: 30 CAPSULE | Refills: 5 | Status: SHIPPED | OUTPATIENT
Start: 2023-02-20

## 2023-03-21 ENCOUNTER — APPOINTMENT (OUTPATIENT)
Dept: LAB | Facility: MEDICAL CENTER | Age: 23
End: 2023-03-21

## 2023-03-21 DIAGNOSIS — R41.3 MEMORY DEFICIT: ICD-10-CM

## 2023-03-21 DIAGNOSIS — E78.00 PURE HYPERCHOLESTEROLEMIA: ICD-10-CM

## 2023-03-21 LAB
ALBUMIN SERPL BCP-MCNC: 3.8 G/DL (ref 3.5–5)
ALP SERPL-CCNC: 60 U/L (ref 46–116)
ALT SERPL W P-5'-P-CCNC: 22 U/L (ref 12–78)
ANION GAP SERPL CALCULATED.3IONS-SCNC: 4 MMOL/L (ref 4–13)
AST SERPL W P-5'-P-CCNC: 13 U/L (ref 5–45)
BILIRUB SERPL-MCNC: 0.81 MG/DL (ref 0.2–1)
BUN SERPL-MCNC: 11 MG/DL (ref 5–25)
CALCIUM SERPL-MCNC: 9.3 MG/DL (ref 8.3–10.1)
CHLORIDE SERPL-SCNC: 105 MMOL/L (ref 96–108)
CHOLEST SERPL-MCNC: 197 MG/DL
CO2 SERPL-SCNC: 27 MMOL/L (ref 21–32)
CREAT SERPL-MCNC: 0.78 MG/DL (ref 0.6–1.3)
FOLATE SERPL-MCNC: 11.2 NG/ML (ref 3.1–17.5)
GFR SERPL CREATININE-BSD FRML MDRD: 106 ML/MIN/1.73SQ M
GLUCOSE P FAST SERPL-MCNC: 117 MG/DL (ref 65–99)
HDLC SERPL-MCNC: 41 MG/DL
LDLC SERPL CALC-MCNC: 113 MG/DL (ref 0–100)
POTASSIUM SERPL-SCNC: 4 MMOL/L (ref 3.5–5.3)
PROT SERPL-MCNC: 7.4 G/DL (ref 6.4–8.4)
SODIUM SERPL-SCNC: 136 MMOL/L (ref 135–147)
TRIGL SERPL-MCNC: 215 MG/DL
VIT B12 SERPL-MCNC: 378 PG/ML (ref 100–900)

## 2023-05-24 DIAGNOSIS — G43.909 MIGRAINE WITHOUT STATUS MIGRAINOSUS, NOT INTRACTABLE, UNSPECIFIED MIGRAINE TYPE: ICD-10-CM

## 2023-05-24 RX ORDER — SUMATRIPTAN 100 MG/1
100 TABLET, FILM COATED ORAL ONCE AS NEEDED
Qty: 9 TABLET | Refills: 0 | Status: SHIPPED | OUTPATIENT
Start: 2023-05-24

## 2023-07-21 DIAGNOSIS — G43.909 MIGRAINE WITHOUT STATUS MIGRAINOSUS, NOT INTRACTABLE, UNSPECIFIED MIGRAINE TYPE: ICD-10-CM

## 2023-07-22 RX ORDER — SUMATRIPTAN 100 MG/1
100 TABLET, FILM COATED ORAL ONCE AS NEEDED
Qty: 9 TABLET | Refills: 0 | Status: SHIPPED | OUTPATIENT
Start: 2023-07-22

## 2023-08-15 DIAGNOSIS — F32.A DEPRESSION, UNSPECIFIED DEPRESSION TYPE: ICD-10-CM

## 2023-08-15 DIAGNOSIS — G43.909 MIGRAINE WITHOUT STATUS MIGRAINOSUS, NOT INTRACTABLE, UNSPECIFIED MIGRAINE TYPE: ICD-10-CM

## 2023-08-15 RX ORDER — VENLAFAXINE HYDROCHLORIDE 150 MG/1
150 CAPSULE, EXTENDED RELEASE ORAL DAILY
Qty: 30 CAPSULE | Refills: 5 | Status: SHIPPED | OUTPATIENT
Start: 2023-08-15

## 2023-08-15 RX ORDER — PROPRANOLOL HYDROCHLORIDE 160 MG/1
160 CAPSULE, EXTENDED RELEASE ORAL DAILY
Qty: 30 CAPSULE | Refills: 5 | Status: SHIPPED | OUTPATIENT
Start: 2023-08-15

## 2023-09-14 DIAGNOSIS — G43.909 MIGRAINE WITHOUT STATUS MIGRAINOSUS, NOT INTRACTABLE, UNSPECIFIED MIGRAINE TYPE: ICD-10-CM

## 2023-09-14 DIAGNOSIS — F32.A DEPRESSION, UNSPECIFIED DEPRESSION TYPE: ICD-10-CM

## 2023-09-14 RX ORDER — PROPRANOLOL HYDROCHLORIDE 160 MG/1
160 CAPSULE, EXTENDED RELEASE ORAL DAILY
Qty: 30 CAPSULE | Refills: 5 | Status: SHIPPED | OUTPATIENT
Start: 2023-09-14

## 2023-09-14 RX ORDER — VENLAFAXINE HYDROCHLORIDE 150 MG/1
150 CAPSULE, EXTENDED RELEASE ORAL DAILY
Qty: 30 CAPSULE | Refills: 5 | Status: SHIPPED | OUTPATIENT
Start: 2023-09-14

## 2023-09-14 NOTE — TELEPHONE ENCOUNTER
Reason for call:     Patient moved home with parents, requesting scripts moved to [facility] in [place]  Patient requested address change and pharmacy change in chart - completed    [x] Refill   [] Prior Auth  [] Other:     Office:   [] PCP/Provider - Romana Mark  [] Speciality/Provider -     Medication: venlafaxine 150mg 1 qd, propranolol 160 mg 1 qd    Quantity: 30     Pharmacy: [address] [place]    Does the patient have enough for 3 days?    [x] Yes   [] No - Send as HP to POD

## 2023-10-11 ENCOUNTER — TELEPHONE (OUTPATIENT)
Age: 23
End: 2023-10-11

## 2023-10-18 ENCOUNTER — OFFICE VISIT (OUTPATIENT)
Dept: FAMILY MEDICINE CLINIC | Facility: CLINIC | Age: 23
End: 2023-10-18
Payer: COMMERCIAL

## 2023-10-18 VITALS
TEMPERATURE: 97.6 F | SYSTOLIC BLOOD PRESSURE: 114 MMHG | HEIGHT: 68 IN | HEART RATE: 89 BPM | BODY MASS INDEX: 40.16 KG/M2 | WEIGHT: 265 LBS | OXYGEN SATURATION: 98 % | DIASTOLIC BLOOD PRESSURE: 57 MMHG

## 2023-10-18 DIAGNOSIS — F41.9 ANXIETY: ICD-10-CM

## 2023-10-18 DIAGNOSIS — F32.A DEPRESSION, UNSPECIFIED DEPRESSION TYPE: Primary | ICD-10-CM

## 2023-10-18 DIAGNOSIS — G43.909 MIGRAINE WITHOUT STATUS MIGRAINOSUS, NOT INTRACTABLE, UNSPECIFIED MIGRAINE TYPE: ICD-10-CM

## 2023-10-18 PROCEDURE — 99214 OFFICE O/P EST MOD 30 MIN: CPT | Performed by: FAMILY MEDICINE

## 2023-10-18 RX ORDER — SERTRALINE HYDROCHLORIDE 25 MG/1
25 TABLET, FILM COATED ORAL DAILY
Qty: 30 TABLET | Refills: 2 | Status: SHIPPED | OUTPATIENT
Start: 2023-10-18

## 2023-10-18 RX ORDER — SUMATRIPTAN 100 MG/1
100 TABLET, FILM COATED ORAL ONCE AS NEEDED
Qty: 9 TABLET | Refills: 0 | Status: SHIPPED | OUTPATIENT
Start: 2023-10-18

## 2023-10-18 RX ORDER — TOPIRAMATE 50 MG/1
50 TABLET, FILM COATED ORAL DAILY
Qty: 30 TABLET | Refills: 2 | Status: SHIPPED | OUTPATIENT
Start: 2023-10-18

## 2023-10-18 RX ORDER — VENLAFAXINE HYDROCHLORIDE 150 MG/1
150 CAPSULE, EXTENDED RELEASE ORAL DAILY
Qty: 30 CAPSULE | Refills: 5 | Status: SHIPPED | OUTPATIENT
Start: 2023-10-18

## 2023-10-18 NOTE — PROGRESS NOTES
Name: Fede Escalante      : 2000      MRN: [id number]  Encounter Provider: Ava Saez MD  Encounter Date: 10/18/2023   Encounter department: [address] [place] [address] Avenue     1. Depression, unspecified depression type  Assessment & Plan:  Patient to continue utilizing medical therapy as well and counseling sources as applicable for condition. If  suicidal thought or fear of suicide to contact 911 and seek help immediately. Meds reviewed and patient questions answered today     Orders:  -     sertraline (ZOLOFT) 25 mg tablet; Take 1 tablet (25 mg total) by mouth daily  -     venlafaxine (EFFEXOR-XR) 150 mg 24 hr capsule; Take 1 capsule (150 mg total) by mouth daily    2. Migraine without status migrainosus, not intractable, unspecified migraine type  Assessment & Plan:  Patient is stable  and will continue present plan of care and reassess at next routine visit. All questions about this problem from patient were answered today. Orders:  -     topiramate (Topamax) 50 MG tablet; Take 1 tablet (50 mg total) by mouth daily  -     SUMAtriptan (IMITREX) 100 mg tablet; Take 1 tablet (100 mg total) by mouth once as needed for migraine for up to 9 doses    3. Anxiety  Assessment & Plan:  Patient to continue utilizing medical therapy as well as counseling sources as applicable to condition. If suicidal thought or fear of suicide attempt, to call 911 and seek help immediately. Medications and therapy reviewed today and all patient  questions answered today. Depression Screening and Follow-up Plan: Patient assessed for underlying major depression. Brief counseling provided and recommend additional follow-up/re-evaluation next office visit. Patient advised to follow-up with PCP for further management.        Subjective     9year-old female today for checkup on multiple medical problems patient was depression anxiety history of migraines BMI of 40 and is doing actually quite well. Patient states she is been having more migraine frequency and does not feel her Inderal is really working as well as it should she is the maximum dose of 160 mg and we will see about transitioning over to Topamax 50 mg at bedtime. Patient also states she is having is a lot of anxiety and she has been using her sertraline on a as needed basis I told her the front to work yet take on a daily basis and she will start taking on a daily basis and we will see how she does with that by her next visit which will be in 6 weeks. Patient also has refills and rest of her medicines we will refill her Effexor and I have told her do not run out of Effexor because she will withdraw from it .       Review of Systems    Past Medical History:   Diagnosis Date   • Anxiety    • Asthma    • Depression    • Exercise induced bronchospasm    • Headache(784.0)    • Migraine    • Migraines      Past Surgical History:   Procedure Laterality Date   • MOUTH SURGERY      Oral surgery procedure     Family History   Problem Relation Age of Onset   • Hypertension Mother    • Hyperlipidemia Mother    • Asthma Mother    • Anxiety disorder Mother    • Depression Mother    • Migraines Mother    • Hypertension Father    • Arthritis Father    • Asthma Father    • Diabetes Father    • Anxiety disorder Sister    • Depression Sister    • Endometriosis Sister    • Depression Maternal Grandmother    • Migraines Maternal Grandmother    • Arthritis Paternal Grandfather    • Diabetes Paternal Grandfather    • Endometriosis Paternal Aunt      Social History     Socioeconomic History   • Marital status: Single     Spouse name: None   • Number of children: None   • Years of education: None   • Highest education level: None   Occupational History   • None   Tobacco Use   • Smoking status: Never   • Smokeless tobacco: Never   Vaping Use   • Vaping Use: Never used   Substance and Sexual Activity   • Alcohol use: Yes     Comment: occasionally   • Drug use: Never   • Sexual activity: Yes     Partners: Male     Birth control/protection: Implant   Other Topics Concern   • None   Social History Narrative    Checked [place]     Social Determinants of Health     Financial Resource Strain: Not on file   Food Insecurity: Not on file   Transportation Needs: Not on file   Physical Activity: Not on file   Stress: Not on file   Social Connections: Not on file   Intimate Partner Violence: Not on file   Housing Stability: Not on file     Current Outpatient Medications on File Prior to Visit   Medication Sig   • albuterol (PROVENTIL HFA,VENTOLIN HFA) 90 mcg/act inhaler 1-2 puffs q6 hrs PRN   • dicyclomine (BENTYL) 20 mg tablet Take 20 mg by mouth every 6 (six) hours   • etonogestrel (NEXPLANON) subdermal implant To be placed at our office.    • ondansetron (ZOFRAN) 4 mg tablet take 1 tablet by mouth every 8 hours if needed for nausea   • [DISCONTINUED] propranolol (INDERAL LA) 160 mg Take 1 capsule (160 mg total) by mouth daily   • [DISCONTINUED] sertraline (ZOLOFT) 25 mg tablet    • [DISCONTINUED] SUMAtriptan (IMITREX) 100 mg tablet Take 1 tablet (100 mg total) by mouth once as needed for migraine for up to 1 dose   • [DISCONTINUED] venlafaxine (EFFEXOR-XR) 150 mg 24 hr capsule Take 1 capsule (150 mg total) by mouth daily     Allergies   Allergen Reactions   • Iodine - Food Allergy Shortness Of Breath   • Lavender Oil Itching     Immunization History   Administered Date(s) Administered   • COVID-19 PFIZER VACCINE 0.3 ML IM 06/02/2021, 06/23/2021   • DTaP 2000, 2000, 03/17/1999, 04/17/2000, 10/08/2002   • HPV Quadrivalent 10/03/2014   • HPV9 09/20/2019   • Hep B, Adolescent or Pediatric 2000, 2000, 03/17/1999   • Hib (PRP-D) 1998, 1998, 01/12/2000, 06/12/2000   • INFLUENZA 10/14/2015, 11/18/2016, 10/02/2017   • IPV 2000, 1998, 03/17/1999, 04/17/2000   • Influenza Split 11/19/2008, 11/02/2009   • Influenza, seasonal, injectable 10/14/2015 • MMR 01/12/2000, 10/08/2002   • Meningococcal Conjugate (MCV4O) 08/03/2017   • Meningococcal MCV4P 08/03/2017   • Meningococcal Polysaccharide (MPSV4) 08/12/2011   • Pneumococcal Conjugate 13-Valent 11/07/2000   • TD (adult) Preservative Free 08/12/2011   • Varicella 10/06/1999, 08/12/2011       Objective     /57 (BP Location: Right arm, Patient Position: Sitting, Cuff Size: Large)   Pulse 89   Temp 97.6 °F (36.4 °C) (Skin)   Ht 5' 8" (1.727 m)   Wt 120 kg (265 lb)   SpO2 98%   BMI 40.29 kg/m²     Physical Exam  Sintia Carter MD

## 2023-11-18 DIAGNOSIS — G43.909 MIGRAINE WITHOUT STATUS MIGRAINOSUS, NOT INTRACTABLE, UNSPECIFIED MIGRAINE TYPE: ICD-10-CM

## 2023-11-18 DIAGNOSIS — R11.0 NAUSEA: ICD-10-CM

## 2023-11-18 DIAGNOSIS — J45.990 EXERCISE-INDUCED BRONCHOSPASM: Primary | ICD-10-CM

## 2023-11-18 DIAGNOSIS — F32.A DEPRESSION, UNSPECIFIED DEPRESSION TYPE: ICD-10-CM

## 2023-11-20 RX ORDER — SERTRALINE HYDROCHLORIDE 25 MG/1
25 TABLET, FILM COATED ORAL DAILY
Qty: 30 TABLET | Refills: 5 | Status: SHIPPED | OUTPATIENT
Start: 2023-11-20

## 2023-11-20 RX ORDER — ALBUTEROL SULFATE 90 UG/1
1 AEROSOL, METERED RESPIRATORY (INHALATION) EVERY 4 HOURS PRN
Qty: 6.7 G | Refills: 1 | Status: SHIPPED | OUTPATIENT
Start: 2023-11-20

## 2023-11-20 RX ORDER — VENLAFAXINE HYDROCHLORIDE 150 MG/1
150 CAPSULE, EXTENDED RELEASE ORAL DAILY
Qty: 30 CAPSULE | Refills: 5 | Status: SHIPPED | OUTPATIENT
Start: 2023-11-20

## 2023-11-20 RX ORDER — TOPIRAMATE 50 MG/1
50 TABLET, FILM COATED ORAL DAILY
Qty: 30 TABLET | Refills: 5 | Status: SHIPPED | OUTPATIENT
Start: 2023-11-20

## 2023-11-20 RX ORDER — ONDANSETRON 4 MG/1
4 TABLET, FILM COATED ORAL EVERY 8 HOURS PRN
Qty: 20 TABLET | Refills: 0 | Status: SHIPPED | OUTPATIENT
Start: 2023-11-20

## 2023-11-20 RX ORDER — SUMATRIPTAN 100 MG/1
100 TABLET, FILM COATED ORAL ONCE AS NEEDED
Qty: 9 TABLET | Refills: 1 | Status: SHIPPED | OUTPATIENT
Start: 2023-11-20

## 2024-01-18 DIAGNOSIS — G43.909 MIGRAINE WITHOUT STATUS MIGRAINOSUS, NOT INTRACTABLE, UNSPECIFIED MIGRAINE TYPE: ICD-10-CM

## 2024-01-18 RX ORDER — SUMATRIPTAN 100 MG/1
100 TABLET, FILM COATED ORAL ONCE AS NEEDED
Qty: 9 TABLET | Refills: 0 | Status: SHIPPED | OUTPATIENT
Start: 2024-01-18

## 2024-01-19 DIAGNOSIS — G43.909 MIGRAINE WITHOUT STATUS MIGRAINOSUS, NOT INTRACTABLE, UNSPECIFIED MIGRAINE TYPE: ICD-10-CM

## 2024-01-19 DIAGNOSIS — F32.A DEPRESSION, UNSPECIFIED DEPRESSION TYPE: ICD-10-CM

## 2024-01-19 RX ORDER — TOPIRAMATE 50 MG/1
50 TABLET, FILM COATED ORAL DAILY
Qty: 30 TABLET | Refills: 0 | Status: SHIPPED | OUTPATIENT
Start: 2024-01-19

## 2024-01-19 RX ORDER — SERTRALINE HYDROCHLORIDE 25 MG/1
25 TABLET, FILM COATED ORAL DAILY
Qty: 30 TABLET | Refills: 0 | Status: SHIPPED | OUTPATIENT
Start: 2024-01-19

## 2024-01-19 RX ORDER — VENLAFAXINE HYDROCHLORIDE 150 MG/1
150 CAPSULE, EXTENDED RELEASE ORAL DAILY
Qty: 30 CAPSULE | Refills: 0 | Status: SHIPPED | OUTPATIENT
Start: 2024-01-19

## 2024-02-16 DIAGNOSIS — F32.A DEPRESSION, UNSPECIFIED DEPRESSION TYPE: ICD-10-CM

## 2024-02-16 DIAGNOSIS — G43.909 MIGRAINE WITHOUT STATUS MIGRAINOSUS, NOT INTRACTABLE, UNSPECIFIED MIGRAINE TYPE: ICD-10-CM

## 2024-02-18 RX ORDER — TOPIRAMATE 50 MG/1
50 TABLET, FILM COATED ORAL DAILY
Qty: 30 TABLET | Refills: 0 | Status: SHIPPED | OUTPATIENT
Start: 2024-02-18

## 2024-02-18 RX ORDER — SERTRALINE HYDROCHLORIDE 25 MG/1
25 TABLET, FILM COATED ORAL DAILY
Qty: 30 TABLET | Refills: 0 | Status: SHIPPED | OUTPATIENT
Start: 2024-02-18

## 2024-02-19 RX ORDER — VENLAFAXINE HYDROCHLORIDE 150 MG/1
150 CAPSULE, EXTENDED RELEASE ORAL DAILY
Qty: 30 CAPSULE | Refills: 0 | Status: SHIPPED | OUTPATIENT
Start: 2024-02-19

## 2024-02-19 RX ORDER — SUMATRIPTAN 100 MG/1
100 TABLET, FILM COATED ORAL ONCE AS NEEDED
Qty: 9 TABLET | Refills: 0 | Status: SHIPPED | OUTPATIENT
Start: 2024-02-19 | End: 2024-02-26 | Stop reason: SDUPTHER

## 2024-02-21 PROBLEM — Z01.419 ENCOUNTER FOR GYNECOLOGICAL EXAMINATION WITHOUT ABNORMAL FINDING: Status: RESOLVED | Noted: 2019-11-08 | Resolved: 2024-02-21

## 2024-02-23 ENCOUNTER — OFFICE VISIT (OUTPATIENT)
Dept: FAMILY MEDICINE CLINIC | Facility: CLINIC | Age: 24
End: 2024-02-23
Payer: COMMERCIAL

## 2024-02-23 VITALS
TEMPERATURE: 97.9 F | RESPIRATION RATE: 18 BRPM | OXYGEN SATURATION: 97 % | DIASTOLIC BLOOD PRESSURE: 80 MMHG | BODY MASS INDEX: 40.16 KG/M2 | HEART RATE: 80 BPM | HEIGHT: 68 IN | WEIGHT: 265 LBS | SYSTOLIC BLOOD PRESSURE: 122 MMHG

## 2024-02-23 DIAGNOSIS — Z00.00 WELL ADULT EXAM: Primary | ICD-10-CM

## 2024-02-23 DIAGNOSIS — G43.909 MIGRAINE WITHOUT STATUS MIGRAINOSUS, NOT INTRACTABLE, UNSPECIFIED MIGRAINE TYPE: ICD-10-CM

## 2024-02-23 DIAGNOSIS — F32.1 CURRENT MODERATE EPISODE OF MAJOR DEPRESSIVE DISORDER WITHOUT PRIOR EPISODE (HCC): ICD-10-CM

## 2024-02-23 DIAGNOSIS — E78.1 HYPERTRIGLYCERIDEMIA WITHOUT HYPERCHOLESTEROLEMIA: ICD-10-CM

## 2024-02-23 DIAGNOSIS — F41.9 ANXIETY: ICD-10-CM

## 2024-02-23 PROCEDURE — 99395 PREV VISIT EST AGE 18-39: CPT | Performed by: FAMILY MEDICINE

## 2024-02-23 NOTE — PROGRESS NOTES
Name: Susie Kureger      : 2000      MRN: [id number]  Encounter Provider: David Coy MD  Encounter Date: 2024   Encounter department: [facility]    Assessment & Plan     1. Well adult exam    2. Current moderate episode of major depressive disorder without prior episode (HCC)  Assessment & Plan:  Patient to continue utilizing medical therapy as well and counseling sources as applicable for condition. If  suicidal thought or fear of suicide to contact 911 and seek help immediately. Meds reviewed and patient questions answered today       3. Migraine without status migrainosus, not intractable, unspecified migraine type  Assessment & Plan:  Patient is stable  and will continue present plan of care and reassess at next routine visit. All questions about this problem from patient were answered today.       4. Anxiety  Assessment & Plan:  Patient to continue utilizing medical therapy as well as counseling sources as applicable to condition. If suicidal thought or fear of suicide attempt, to call 911 and seek help immediately. Medications and therapy reviewed today and all patient  questions answered today.       5. Hypertriglyceridemia without hypercholesterolemia  Assessment & Plan:  Patient  is stable with current medication and we discussed a low fat low cholesterol diet. Weight loss also discussed for this will help lower cholesterol also. Recheck lipids in 6 months.              Subjective     HPI  Review of Systems    Past Medical History:   Diagnosis Date   • Anxiety    • Asthma    • Depression    • Exercise induced bronchospasm    • Headache(784.0)    • Migraine    • Migraines      Past Surgical History:   Procedure Laterality Date   • MOUTH SURGERY      Oral surgery procedure     Family History   Problem Relation Age of Onset   • Hypertension Mother    • Hyperlipidemia Mother    • Asthma Mother    • Anxiety disorder Mother    • Depression Mother    • Migraines  Mother    • Hypertension Father    • Arthritis Father    • Asthma Father    • Diabetes Father    • Anxiety disorder Sister    • Depression Sister    • Endometriosis Sister    • Depression Maternal Grandmother    • Migraines Maternal Grandmother    • Arthritis Paternal Grandfather    • Diabetes Paternal Grandfather    • Endometriosis Paternal Aunt      Social History     Socioeconomic History   • Marital status: Single     Spouse name: None   • Number of children: None   • Years of education: None   • Highest education level: None   Occupational History   • None   Tobacco Use   • Smoking status: Never     Passive exposure: Never   • Smokeless tobacco: Never   Vaping Use   • Vaping status: Never Used   Substance and Sexual Activity   • Alcohol use: Yes     Comment: occasionally   • Drug use: Never   • Sexual activity: Yes     Partners: Male     Birth control/protection: Implant   Other Topics Concern   • None   Social History Narrative    Checked [place]     Social Determinants of Health     Financial Resource Strain: Not on file   Food Insecurity: Not on file   Transportation Needs: Not on file   Physical Activity: Not on file   Stress: Not on file   Social Connections: Not on file   Intimate Partner Violence: Not on file   Housing Stability: Not on file     Current Outpatient Medications on File Prior to Visit   Medication Sig   • albuterol (PROVENTIL HFA,VENTOLIN HFA) 90 mcg/act inhaler Inhale 1 puff every 4 (four) hours as needed for wheezing   • ondansetron (ZOFRAN) 4 mg tablet Take 1 tablet (4 mg total) by mouth every 8 (eight) hours as needed for nausea or vomiting   • sertraline (ZOLOFT) 25 mg tablet TAKE ONE TABLET BY MOUTH EVERY DAY   • SUMAtriptan (IMITREX) 100 mg tablet Take 1 tablet (100 mg total) by mouth once as needed for migraine for up to 18 doses   • topiramate (TOPAMAX) 50 MG tablet TAKE ONE TABLET BY MOUTH EVERY DAY   • venlafaxine (EFFEXOR-XR) 150 mg 24 hr capsule TAKE ONE CAPSULE BY MOUTH EVERY  "DAY   • dicyclomine (BENTYL) 20 mg tablet Take 20 mg by mouth every 6 (six) hours   • etonogestrel (NEXPLANON) subdermal implant To be placed at our office.     Allergies   Allergen Reactions   • Iodine - Food Allergy Shortness Of Breath   • Lavender Oil Itching     Immunization History   Administered Date(s) Administered   • COVID-19 PFIZER VACCINE 0.3 ML IM 06/02/2021, 06/23/2021   • DTaP 1998, 1998, 03/17/1999, 04/17/2000, 10/08/2002   • HPV Quadrivalent 10/03/2014   • HPV9 09/20/2019   • Hep B, Adolescent or Pediatric 1998, 1998, 03/17/1999   • Hib (PRP-D) 1998, 1998, 01/12/2000, 06/12/2000   • INFLUENZA 10/14/2015, 11/18/2016, 10/02/2017   • IPV 1998, 1998, 03/17/1999, 04/17/2000   • Influenza Split 11/19/2008, 11/02/2009   • Influenza, seasonal, injectable 10/14/2015   • MMR 01/12/2000, 10/08/2002   • Meningococcal Conjugate (MCV4O) 08/03/2017   • Meningococcal MCV4P 08/03/2017   • Meningococcal Polysaccharide (MPSV4) 08/12/2011   • Pneumococcal Conjugate 13-Valent 11/07/2000   • TD (adult) Preservative Free 08/12/2011   • Varicella 10/06/1999, 08/12/2011       Objective     /80 (BP Location: Left arm, Patient Position: Sitting, Cuff Size: Large)   Pulse 80   Temp 97.9 °F (36.6 °C)   Resp 18   Ht 5' 8\" (1.727 m)   Wt 120 kg (265 lb)   SpO2 97%   BMI 40.29 kg/m²     Physical Exam  David Coy MD    "

## 2024-02-26 DIAGNOSIS — R11.0 NAUSEA: ICD-10-CM

## 2024-02-26 DIAGNOSIS — G43.909 MIGRAINE WITHOUT STATUS MIGRAINOSUS, NOT INTRACTABLE, UNSPECIFIED MIGRAINE TYPE: ICD-10-CM

## 2024-02-27 RX ORDER — SUMATRIPTAN 100 MG/1
100 TABLET, FILM COATED ORAL ONCE AS NEEDED
Qty: 9 TABLET | Refills: 1 | Status: SHIPPED | OUTPATIENT
Start: 2024-02-27

## 2024-02-27 RX ORDER — ONDANSETRON 4 MG/1
4 TABLET, FILM COATED ORAL EVERY 8 HOURS PRN
Qty: 20 TABLET | Refills: 1 | Status: SHIPPED | OUTPATIENT
Start: 2024-02-27

## 2024-03-11 DIAGNOSIS — G43.909 MIGRAINE WITHOUT STATUS MIGRAINOSUS, NOT INTRACTABLE, UNSPECIFIED MIGRAINE TYPE: ICD-10-CM

## 2024-03-11 RX ORDER — SUMATRIPTAN 100 MG/1
100 TABLET, FILM COATED ORAL ONCE AS NEEDED
Qty: 9 TABLET | Refills: 1 | Status: SHIPPED | OUTPATIENT
Start: 2024-03-11

## 2024-03-16 DIAGNOSIS — G43.909 MIGRAINE WITHOUT STATUS MIGRAINOSUS, NOT INTRACTABLE, UNSPECIFIED MIGRAINE TYPE: ICD-10-CM

## 2024-03-17 RX ORDER — SUMATRIPTAN 100 MG/1
100 TABLET, FILM COATED ORAL ONCE AS NEEDED
Qty: 9 TABLET | Refills: 0 | OUTPATIENT
Start: 2024-03-17

## 2024-03-18 DIAGNOSIS — G43.909 MIGRAINE WITHOUT STATUS MIGRAINOSUS, NOT INTRACTABLE, UNSPECIFIED MIGRAINE TYPE: ICD-10-CM

## 2024-03-18 DIAGNOSIS — F32.A DEPRESSION, UNSPECIFIED DEPRESSION TYPE: ICD-10-CM

## 2024-03-18 DIAGNOSIS — K58.9 IRRITABLE BOWEL SYNDROME, UNSPECIFIED TYPE: Primary | ICD-10-CM

## 2024-03-19 RX ORDER — TOPIRAMATE 50 MG/1
50 TABLET, FILM COATED ORAL DAILY
Qty: 30 TABLET | Refills: 5 | Status: SHIPPED | OUTPATIENT
Start: 2024-03-19 | End: 2024-03-25

## 2024-03-19 RX ORDER — VENLAFAXINE HYDROCHLORIDE 150 MG/1
150 CAPSULE, EXTENDED RELEASE ORAL DAILY
Qty: 30 CAPSULE | Refills: 5 | Status: SHIPPED | OUTPATIENT
Start: 2024-03-19

## 2024-03-19 RX ORDER — SERTRALINE HYDROCHLORIDE 25 MG/1
25 TABLET, FILM COATED ORAL DAILY
Qty: 30 TABLET | Refills: 5 | Status: SHIPPED | OUTPATIENT
Start: 2024-03-19

## 2024-03-19 RX ORDER — DICYCLOMINE HCL 20 MG
20 TABLET ORAL EVERY 6 HOURS
Qty: 120 TABLET | Refills: 5 | Status: SHIPPED | OUTPATIENT
Start: 2024-03-19 | End: 2026-02-10

## 2024-03-24 NOTE — PROGRESS NOTES
Name: Susie Krueger      : 2000      MRN: [id number]  Encounter Provider: David Coy MD  Encounter Date: 3/25/2024   Encounter department: [facility]    Assessment & Plan     1. Migraine without status migrainosus, not intractable, unspecified migraine type  Assessment & Plan:  Patient is stable  and will continue present plan of care and reassess at next routine visit. All questions about this problem from patient were answered today.     Orders:  -     topiramate (TOPAMAX) 100 mg tablet; Take 1 tablet (100 mg total) by mouth daily    2. Current moderate episode of major depressive disorder without prior episode (HCC)  Assessment & Plan:  Patient to continue utilizing medical therapy as well and counseling sources as applicable for condition. If  suicidal thought or fear of suicide to contact 911 and seek help immediately. Meds reviewed and patient questions answered today              Subjective     23-year-old female today for checkup on multimedical problems patient with history of depression anxiety and migraines.  Patient states she is getting migraines on a daily basis so she takes Imitrex 100 mg on a daily basis and states this really not helping like it was.  Discussed with her options of increasing her Imitrex from the tablet to the nasal spray and she is really not liking that idea.  Patient does take Topamax 50 mg for suppressive therapy working to see about doubling up to 100 mg.  I would like to see about giving her some Nurtec which she would use for treatment of the migraines when she has them as well as suppressive therapy she will look and see if she is coverage for that.  Bring her back in approximately 6 weeks to see how she is doing with the Topamax treatment and she could still use the Imitrex which hopefully she may not need to use so much.      Review of Systems   Constitutional:  Negative for activity change, appetite change, fatigue and fever.    HENT:  Negative for congestion, ear pain, postnasal drip, rhinorrhea, sinus pressure, sinus pain, sneezing and sore throat.    Eyes:  Negative for pain and redness.   Respiratory:  Negative for apnea, cough, chest tightness, shortness of breath and wheezing.    Cardiovascular:  Negative for chest pain, palpitations and leg swelling.   Gastrointestinal:  Negative for abdominal pain, constipation, diarrhea, nausea and vomiting.   Endocrine: Negative for cold intolerance and heat intolerance.   Genitourinary:  Negative for difficulty urinating, dysuria, frequency, hematuria and urgency.   Musculoskeletal:  Negative for arthralgias, back pain, gait problem and myalgias.   Skin:  Negative for rash.   Neurological:  Positive for headaches. Negative for dizziness, speech difficulty, weakness and numbness.   Hematological:  Does not bruise/bleed easily.   Psychiatric/Behavioral:  Negative for agitation, confusion and hallucinations.        Past Medical History:   Diagnosis Date   • Anxiety    • Asthma    • Depression    • Exercise induced bronchospasm    • Headache(784.0)    • Migraine    • Migraines      Past Surgical History:   Procedure Laterality Date   • MOUTH SURGERY      Oral surgery procedure     Family History   Problem Relation Age of Onset   • Hypertension Mother    • Hyperlipidemia Mother    • Asthma Mother    • Anxiety disorder Mother    • Depression Mother    • Migraines Mother    • Hypertension Father    • Arthritis Father    • Asthma Father    • Diabetes Father    • Anxiety disorder Sister    • Depression Sister    • Endometriosis Sister    • Depression Maternal Grandmother    • Migraines Maternal Grandmother    • Arthritis Paternal Grandfather    • Diabetes Paternal Grandfather    • Endometriosis Paternal Aunt      Social History     Socioeconomic History   • Marital status: Single     Spouse name: None   • Number of children: None   • Years of education: None   • Highest education level: None    Occupational History   • None   Tobacco Use   • Smoking status: Never     Passive exposure: Never   • Smokeless tobacco: Never   Vaping Use   • Vaping status: Never Used   Substance and Sexual Activity   • Alcohol use: Yes     Comment: occasionally   • Drug use: Never   • Sexual activity: Yes     Partners: Male     Birth control/protection: Implant   Other Topics Concern   • None   Social History Narrative    Checked [place]     Social Determinants of Health     Financial Resource Strain: Not on file   Food Insecurity: Not on file   Transportation Needs: Not on file   Physical Activity: Not on file   Stress: Not on file   Social Connections: Not on file   Intimate Partner Violence: Not on file   Housing Stability: Not on file     Current Outpatient Medications on File Prior to Visit   Medication Sig   • albuterol (PROVENTIL HFA,VENTOLIN HFA) 90 mcg/act inhaler Inhale 1 puff every 4 (four) hours as needed for wheezing   • aspirin-acetaminophen-caffeine (Excedrin Migraine) 250-250-65 MG per tablet    • dicyclomine (BENTYL) 20 mg tablet Take 1 tablet (20 mg total) by mouth every 6 (six) hours   • etonogestrel (NEXPLANON) subdermal implant To be placed at our office.   • Ibuprofen 200 MG CAPS    • ondansetron (ZOFRAN) 4 mg tablet Take 1 tablet (4 mg total) by mouth every 8 (eight) hours as needed for nausea or vomiting   • sertraline (ZOLOFT) 25 mg tablet Take 1 tablet (25 mg total) by mouth daily   • SUMAtriptan (IMITREX) 100 mg tablet Take 1 tablet (100 mg total) by mouth once as needed for migraine for up to 18 doses   • venlafaxine (EFFEXOR-XR) 150 mg 24 hr capsule Take 1 capsule (150 mg total) by mouth daily   • [DISCONTINUED] topiramate (TOPAMAX) 50 MG tablet Take 1 tablet (50 mg total) by mouth daily     Allergies   Allergen Reactions   • Iodine - Food Allergy Shortness Of Breath   • Lavender Oil Itching     Immunization History   Administered Date(s) Administered   • COVID-19 PFIZER VACCINE 0.3 ML IM  "06/02/2021, 06/23/2021   • DTaP 1998, 1998, 03/17/1999, 04/17/2000, 10/08/2002   • HPV Quadrivalent 10/03/2014   • HPV9 09/20/2019   • Hep B, Adolescent or Pediatric 1998, 1998, 03/17/1999   • Hib (PRP-D) 1998, 1998, 01/12/2000, 06/12/2000   • INFLUENZA 10/14/2015, 11/18/2016, 10/02/2017   • IPV 1998, 1998, 03/17/1999, 04/17/2000   • Influenza Split 11/19/2008, 11/02/2009   • Influenza, seasonal, injectable 10/14/2015   • MMR 01/12/2000, 10/08/2002   • Meningococcal Conjugate (MCV4O) 08/03/2017   • Meningococcal MCV4P 08/03/2017   • Meningococcal Polysaccharide (MPSV4) 08/12/2011   • Pneumococcal Conjugate 13-Valent 11/07/2000   • TD (adult) Preservative Free 08/12/2011   • Varicella 10/06/1999, 08/12/2011       Objective     /100 (BP Location: Right arm, Patient Position: Sitting, Cuff Size: Large)   Pulse 97   Temp (!) 97.2 °F (36.2 °C) (Skin)   Ht 5' 8\" (1.727 m)   Wt 120 kg (265 lb)   SpO2 98%   BMI 40.29 kg/m²     Physical Exam  Vitals and nursing note reviewed.   Constitutional:       Appearance: She is well-developed.   HENT:      Head: Normocephalic and atraumatic.      Nose: Nose normal.      Mouth/Throat:      Mouth: Mucous membranes are moist.   Eyes:      General: No scleral icterus.     Conjunctiva/sclera: Conjunctivae normal.      Pupils: Pupils are equal, round, and reactive to light.   Neck:      Thyroid: No thyromegaly.   Cardiovascular:      Rate and Rhythm: Normal rate and regular rhythm.   Pulmonary:      Effort: Pulmonary effort is normal.      Breath sounds: Normal breath sounds. No wheezing.   Abdominal:      General: Bowel sounds are normal. There is no distension.      Palpations: Abdomen is soft.      Tenderness: There is no abdominal tenderness. There is no guarding or rebound.   Musculoskeletal:         General: No tenderness or deformity. Normal range of motion.      Cervical back: Normal range of motion and neck supple. "   Skin:     General: Skin is warm and dry.      Findings: No erythema or rash.   Neurological:      Mental Status: She is alert and oriented to person, place, and time.      Sensory: No sensory deficit.   Psychiatric:         Mood and Affect: Mood normal.         Behavior: Behavior normal.         Thought Content: Thought content normal.         Judgment: Judgment normal.       David Coy MD

## 2024-03-25 ENCOUNTER — OFFICE VISIT (OUTPATIENT)
Dept: FAMILY MEDICINE CLINIC | Facility: CLINIC | Age: 24
End: 2024-03-25
Payer: COMMERCIAL

## 2024-03-25 VITALS
BODY MASS INDEX: 40.16 KG/M2 | HEIGHT: 68 IN | HEART RATE: 97 BPM | WEIGHT: 265 LBS | SYSTOLIC BLOOD PRESSURE: 142 MMHG | OXYGEN SATURATION: 98 % | DIASTOLIC BLOOD PRESSURE: 100 MMHG | TEMPERATURE: 97.2 F

## 2024-03-25 DIAGNOSIS — G43.909 MIGRAINE WITHOUT STATUS MIGRAINOSUS, NOT INTRACTABLE, UNSPECIFIED MIGRAINE TYPE: Primary | ICD-10-CM

## 2024-03-25 DIAGNOSIS — F32.1 CURRENT MODERATE EPISODE OF MAJOR DEPRESSIVE DISORDER WITHOUT PRIOR EPISODE (HCC): ICD-10-CM

## 2024-03-25 PROCEDURE — 99214 OFFICE O/P EST MOD 30 MIN: CPT | Performed by: FAMILY MEDICINE

## 2024-03-25 RX ORDER — OMEGA-3 FATTY ACIDS/FISH OIL 300-1000MG
CAPSULE ORAL
COMMUNITY

## 2024-03-25 RX ORDER — TOPIRAMATE 100 MG/1
100 TABLET, FILM COATED ORAL DAILY
Qty: 30 TABLET | Refills: 5 | Status: SHIPPED | OUTPATIENT
Start: 2024-03-25

## 2024-03-27 ENCOUNTER — TELEPHONE (OUTPATIENT)
Age: 24
End: 2024-03-27

## 2024-03-27 DIAGNOSIS — G43.E09 CHRONIC MIGRAINE WITH AURA WITHOUT STATUS MIGRAINOSUS, NOT INTRACTABLE: Primary | ICD-10-CM

## 2024-03-27 NOTE — TELEPHONE ENCOUNTER
Patient called insurance Nurtec is covered but will need piror auth. Please send medication to [facility] pharmacy   Please advise

## 2024-04-02 NOTE — TELEPHONE ENCOUNTER
PA for Nurtec 75 mg    Submitted via    []CMM-KEY   []Surescripts-Case ID #   []Faxed to plan   [x]Other website - [email or web address]  Prior Auth (EOC) ID: [id number]  []Phone call Case ID #     Office notes sent, clinical questions answered. Awaiting determination    Turnaround time for your insurance to make a decision on your Prior Authorization can take 7-21 business days.

## 2024-04-03 NOTE — TELEPHONE ENCOUNTER
Pt called back to say that because this med was not prescribed by neurology and because she hasn't tried calcitonin or emgality, they would not approve it. She said that Dr. Coy wanted her on it and would reach out to the insurance if needed. Please, advise.

## 2024-04-03 NOTE — TELEPHONE ENCOUNTER
Insurance Company calling stating Nurtec medication is DENIED.    Ref # [id number]   Patient needs to try two medications prior and headache sfor more than a month.  They are faxing over denial today.

## 2024-04-04 NOTE — TELEPHONE ENCOUNTER
Call pt. Insurance notified me they will not cover nurtec and will only cover 2 other meds for migraines and they are aimovig and emgality and they are both self injectable medicines. She will need to schedule an OV to discuss their usage.

## 2024-04-09 RX ORDER — ERENUMAB-AOOE 70 MG/ML
70 INJECTION SUBCUTANEOUS
Qty: 1 ML | Refills: 5 | Status: CANCELLED | OUTPATIENT
Start: 2024-04-09

## 2024-04-09 RX ORDER — GALCANEZUMAB 120 MG/ML
120 INJECTION, SOLUTION SUBCUTANEOUS
Qty: 1 ML | Refills: 5 | Status: CANCELLED | OUTPATIENT
Start: 2024-04-09

## 2024-04-10 ENCOUNTER — OFFICE VISIT (OUTPATIENT)
Dept: FAMILY MEDICINE CLINIC | Facility: CLINIC | Age: 24
End: 2024-04-10
Payer: COMMERCIAL

## 2024-04-10 VITALS
BODY MASS INDEX: 39.71 KG/M2 | WEIGHT: 262 LBS | OXYGEN SATURATION: 97 % | HEIGHT: 68 IN | TEMPERATURE: 97.2 F | DIASTOLIC BLOOD PRESSURE: 82 MMHG | HEART RATE: 94 BPM | SYSTOLIC BLOOD PRESSURE: 136 MMHG

## 2024-04-10 DIAGNOSIS — G43.909 MIGRAINE WITHOUT STATUS MIGRAINOSUS, NOT INTRACTABLE, UNSPECIFIED MIGRAINE TYPE: Primary | ICD-10-CM

## 2024-04-10 PROCEDURE — 99213 OFFICE O/P EST LOW 20 MIN: CPT | Performed by: FAMILY MEDICINE

## 2024-04-10 RX ORDER — RIZATRIPTAN BENZOATE 10 MG/1
10 TABLET ORAL ONCE AS NEEDED
Qty: 10 TABLET | Refills: 5 | Status: SHIPPED | OUTPATIENT
Start: 2024-04-10

## 2024-04-10 NOTE — PROGRESS NOTES
Name: Susie Krueger      : 2000      MRN: [id number]  Encounter Provider: David Coy MD  Encounter Date: 4/10/2024   Encounter department: [facility]    Assessment & Plan     1. Migraine without status migrainosus, not intractable, unspecified migraine type  Assessment & Plan:  Insurance will not cover nurtec    Orders:  -     rizatriptan (MAXALT) 10 mg tablet; Take 1 tablet (10 mg total) by mouth once as needed for migraine for up to 1 dose may repeat in 2 hours if necessary           Subjective     23-year-old female today for checkup on her migraines.  The patient was given a prescription for Nurtec for taking care of her acute as well as suppressing her migraines unfortunately she has no coverage for this and does have coverage for Emgality as well as Aimovig.  Discussed with patient about using injectable medications for prevention but she really does not want to do that.  At this time she would like to see about trying a different triptan and working to see about giving her some Maxalt she is going to take the 10 mg dose she is going to take this for treatment for migraines in regard try this and we may want to use Aimovig or Emgality in the future if she needs to.      Review of Systems    Past Medical History:   Diagnosis Date   • Anxiety    • Asthma    • Depression    • Exercise induced bronchospasm    • Headache(784.0)    • Migraine    • Migraines      Past Surgical History:   Procedure Laterality Date   • MOUTH SURGERY      Oral surgery procedure     Family History   Problem Relation Age of Onset   • Hypertension Mother    • Hyperlipidemia Mother    • Asthma Mother    • Anxiety disorder Mother    • Depression Mother    • Migraines Mother    • Hypertension Father    • Arthritis Father    • Asthma Father    • Diabetes Father    • Anxiety disorder Sister    • Depression Sister    • Endometriosis Sister    • Depression Maternal Grandmother    • Migraines Maternal  Grandmother    • Arthritis Paternal Grandfather    • Diabetes Paternal Grandfather    • Endometriosis Paternal Aunt      Social History     Socioeconomic History   • Marital status: Single     Spouse name: None   • Number of children: None   • Years of education: None   • Highest education level: None   Occupational History   • None   Tobacco Use   • Smoking status: Never     Passive exposure: Never   • Smokeless tobacco: Never   Vaping Use   • Vaping status: Never Used   Substance and Sexual Activity   • Alcohol use: Yes     Comment: occasionally   • Drug use: Never   • Sexual activity: Yes     Partners: Male     Birth control/protection: Implant   Other Topics Concern   • None   Social History Narrative    Checked [place]     Social Determinants of Health     Financial Resource Strain: Not on file   Food Insecurity: Not on file   Transportation Needs: Not on file   Physical Activity: Not on file   Stress: Not on file   Social Connections: Not on file   Intimate Partner Violence: Not on file   Housing Stability: Not on file     Current Outpatient Medications on File Prior to Visit   Medication Sig   • albuterol (PROVENTIL HFA,VENTOLIN HFA) 90 mcg/act inhaler Inhale 1 puff every 4 (four) hours as needed for wheezing   • aspirin-acetaminophen-caffeine (Excedrin Migraine) 250-250-65 MG per tablet    • dicyclomine (BENTYL) 20 mg tablet Take 1 tablet (20 mg total) by mouth every 6 (six) hours   • etonogestrel (NEXPLANON) subdermal implant To be placed at our office.   • Ibuprofen 200 MG CAPS    • ondansetron (ZOFRAN) 4 mg tablet Take 1 tablet (4 mg total) by mouth every 8 (eight) hours as needed for nausea or vomiting   • sertraline (ZOLOFT) 25 mg tablet Take 1 tablet (25 mg total) by mouth daily   • topiramate (TOPAMAX) 100 mg tablet Take 1 tablet (100 mg total) by mouth daily   • venlafaxine (EFFEXOR-XR) 150 mg 24 hr capsule Take 1 capsule (150 mg total) by mouth daily   • [DISCONTINUED] SUMAtriptan (IMITREX) 100 mg  "tablet Take 1 tablet (100 mg total) by mouth once as needed for migraine for up to 18 doses   • [DISCONTINUED] rimegepant sulfate (NURTEC) 75 mg TBDP Take 1 tablet (75 mg total) by mouth every other day (Patient not taking: Reported on 4/10/2024)     Allergies   Allergen Reactions   • Iodine - Food Allergy Shortness Of Breath   • Lavender Oil Itching     Immunization History   Administered Date(s) Administered   • COVID-19 PFIZER VACCINE 0.3 ML IM 06/02/2021, 06/23/2021   • DTaP 1998, 1998, 03/17/1999, 04/17/2000, 10/08/2002   • HPV Quadrivalent 10/03/2014   • HPV9 09/20/2019   • Hep B, Adolescent or Pediatric 1998, 1998, 03/17/1999   • Hib (PRP-D) 1998, 1998, 01/12/2000, 06/12/2000   • INFLUENZA 10/14/2015, 11/18/2016, 10/02/2017   • IPV 1998, 1998, 03/17/1999, 04/17/2000   • Influenza Split 11/19/2008, 11/02/2009   • Influenza, seasonal, injectable 10/14/2015   • MMR 01/12/2000, 10/08/2002   • Meningococcal Conjugate (MCV4O) 08/03/2017   • Meningococcal MCV4P 08/03/2017   • Meningococcal Polysaccharide (MPSV4) 08/12/2011   • Pneumococcal Conjugate 13-Valent 11/07/2000   • TD (adult) Preservative Free 08/12/2011   • Varicella 10/06/1999, 08/12/2011       Objective     /82 (BP Location: Left arm, Patient Position: Sitting, Cuff Size: Large)   Pulse 94   Temp (!) 97.2 °F (36.2 °C) (Skin)   Ht 5' 8\" (1.727 m)   Wt 119 kg (262 lb)   SpO2 97%   BMI 39.84 kg/m²     Physical Exam  David Coy MD    "

## 2024-04-29 DIAGNOSIS — G43.909 MIGRAINE WITHOUT STATUS MIGRAINOSUS, NOT INTRACTABLE, UNSPECIFIED MIGRAINE TYPE: ICD-10-CM

## 2024-04-30 RX ORDER — RIZATRIPTAN BENZOATE 10 MG/1
10 TABLET ORAL ONCE AS NEEDED
Qty: 10 TABLET | Refills: 1 | Status: SHIPPED | OUTPATIENT
Start: 2024-04-30

## 2024-05-08 ENCOUNTER — OFFICE VISIT (OUTPATIENT)
Dept: FAMILY MEDICINE CLINIC | Facility: CLINIC | Age: 24
End: 2024-05-08
Payer: COMMERCIAL

## 2024-05-08 VITALS
RESPIRATION RATE: 20 BRPM | DIASTOLIC BLOOD PRESSURE: 80 MMHG | HEIGHT: 68 IN | BODY MASS INDEX: 39.93 KG/M2 | SYSTOLIC BLOOD PRESSURE: 128 MMHG | OXYGEN SATURATION: 97 % | TEMPERATURE: 98.1 F | WEIGHT: 263.5 LBS | HEART RATE: 100 BPM

## 2024-05-08 DIAGNOSIS — F32.1 CURRENT MODERATE EPISODE OF MAJOR DEPRESSIVE DISORDER WITHOUT PRIOR EPISODE (HCC): ICD-10-CM

## 2024-05-08 DIAGNOSIS — G43.909 MIGRAINE WITHOUT STATUS MIGRAINOSUS, NOT INTRACTABLE, UNSPECIFIED MIGRAINE TYPE: ICD-10-CM

## 2024-05-08 DIAGNOSIS — F41.9 ANXIETY: Primary | ICD-10-CM

## 2024-05-08 DIAGNOSIS — Z12.4 SCREENING FOR CERVICAL CANCER: ICD-10-CM

## 2024-05-08 PROCEDURE — 99213 OFFICE O/P EST LOW 20 MIN: CPT | Performed by: FAMILY MEDICINE

## 2024-05-08 RX ORDER — RIZATRIPTAN BENZOATE 10 MG/1
10 TABLET ORAL ONCE AS NEEDED
Qty: 10 TABLET | Refills: 5 | Status: SHIPPED | OUTPATIENT
Start: 2024-05-08

## 2024-05-08 NOTE — PROGRESS NOTES
Name: Susie Krueger      : 2000      MRN: [id number]  Encounter Provider: David Coy MD  Encounter Date: 2024   Encounter department: [facility]    Assessment & Plan     1. Anxiety  Assessment & Plan:  Patient to continue utilizing medical therapy as well as counseling sources as applicable to condition. If suicidal thought or fear of suicide attempt, to call 911 and seek help immediately. Medications and therapy reviewed today and all patient  questions answered today.       2. Current moderate episode of major depressive disorder without prior episode (HCC)  Assessment & Plan:  Patient to continue utilizing medical therapy as well and counseling sources as applicable for condition. If  suicidal thought or fear of suicide to contact 911 and seek help immediately. Meds reviewed and patient questions answered today       3. Migraine without status migrainosus, not intractable, unspecified migraine type  Assessment & Plan:  Patient is stable  and will continue present plan of care and reassess at next routine visit. All questions about this problem from patient were answered today.     Orders:  -     rizatriptan (MAXALT) 10 mg tablet; Take 1 tablet (10 mg total) by mouth once as needed for migraine for up to 1 dose may repeat in 2 hours if necessary    4. Screening for cervical cancer           Subjective     23-year-old female here today for checkup on multimedical problems patient history of migraines some depression some anxiety patient in a really low mood today due to break-up with her boyfriend.  Patient states that she would like to have her Maxalt really refilled today because that was really helping with her migraines I will refill that for her today also.  Patient lives over an hour away we will refill her medicines until she comes back we will see her back in approximately 6 months if she has any other troubles in the meantime we can see her back as  needed.      Review of Systems   Constitutional:  Negative for activity change, appetite change, fatigue and fever.   HENT:  Negative for congestion, ear pain, postnasal drip, rhinorrhea, sinus pressure, sinus pain, sneezing and sore throat.    Eyes:  Negative for pain and redness.   Respiratory:  Negative for apnea, cough, chest tightness, shortness of breath and wheezing.    Cardiovascular:  Negative for chest pain, palpitations and leg swelling.   Gastrointestinal:  Negative for abdominal pain, constipation, diarrhea, nausea and vomiting.   Endocrine: Negative for cold intolerance and heat intolerance.   Genitourinary:  Negative for difficulty urinating, dysuria, frequency, hematuria and urgency.   Musculoskeletal:  Negative for arthralgias, back pain, gait problem and myalgias.   Skin:  Negative for rash.   Neurological:  Negative for dizziness, speech difficulty, weakness, numbness and headaches.   Hematological:  Does not bruise/bleed easily.   Psychiatric/Behavioral:  Negative for agitation, confusion and hallucinations.        Past Medical History:   Diagnosis Date   • Anxiety    • Asthma    • Depression    • Exercise induced bronchospasm    • Headache(784.0)    • Migraine    • Migraines      Past Surgical History:   Procedure Laterality Date   • MOUTH SURGERY      Oral surgery procedure     Family History   Problem Relation Age of Onset   • Hypertension Mother    • Hyperlipidemia Mother    • Asthma Mother    • Anxiety disorder Mother    • Depression Mother    • Migraines Mother    • Hypertension Father    • Arthritis Father    • Asthma Father    • Diabetes Father    • Anxiety disorder Sister    • Depression Sister    • Endometriosis Sister    • Depression Maternal Grandmother    • Migraines Maternal Grandmother    • Arthritis Paternal Grandfather    • Diabetes Paternal Grandfather    • Endometriosis Paternal Aunt      Social History     Socioeconomic History   • Marital status: Single     Spouse name: None    • Number of children: None   • Years of education: None   • Highest education level: None   Occupational History   • None   Tobacco Use   • Smoking status: Never     Passive exposure: Never   • Smokeless tobacco: Never   Vaping Use   • Vaping status: Never Used   Substance and Sexual Activity   • Alcohol use: Yes     Comment: occasionally   • Drug use: Never   • Sexual activity: Yes     Partners: Male     Birth control/protection: Implant   Other Topics Concern   • None   Social History Narrative    Checked [place]     Social Determinants of Health     Financial Resource Strain: Not on file   Food Insecurity: Not on file   Transportation Needs: Not on file   Physical Activity: Not on file   Stress: Not on file   Social Connections: Not on file   Intimate Partner Violence: Not on file   Housing Stability: Not on file     Current Outpatient Medications on File Prior to Visit   Medication Sig   • albuterol (PROVENTIL HFA,VENTOLIN HFA) 90 mcg/act inhaler Inhale 1 puff every 4 (four) hours as needed for wheezing   • aspirin-acetaminophen-caffeine (Excedrin Migraine) 250-250-65 MG per tablet    • dicyclomine (BENTYL) 20 mg tablet Take 1 tablet (20 mg total) by mouth every 6 (six) hours   • Ibuprofen 200 MG CAPS    • ondansetron (ZOFRAN) 4 mg tablet Take 1 tablet (4 mg total) by mouth every 8 (eight) hours as needed for nausea or vomiting   • rizatriptan (MAXALT) 10 mg tablet Take 1 tablet (10 mg total) by mouth once as needed for migraine for up to 20 doses may repeat in 2 hours if necessary   • sertraline (ZOLOFT) 25 mg tablet Take 1 tablet (25 mg total) by mouth daily   • topiramate (TOPAMAX) 100 mg tablet Take 1 tablet (100 mg total) by mouth daily   • venlafaxine (EFFEXOR-XR) 150 mg 24 hr capsule Take 1 capsule (150 mg total) by mouth daily   • etonogestrel (NEXPLANON) subdermal implant To be placed at our office.     Allergies   Allergen Reactions   • Iodine - Food Allergy Shortness Of Breath   • Lavender Oil  "Itching     Immunization History   Administered Date(s) Administered   • COVID-19 PFIZER VACCINE 0.3 ML IM 06/02/2021, 06/23/2021   • DTaP 2000, 2000, 03/17/1999, 04/17/2000, 10/08/2002   • HPV Quadrivalent 10/03/2014   • HPV9 09/20/2019   • Hep B, Adolescent or Pediatric 1998, 1998, 03/17/1999   • Hib (PRP-D) 1998, 1998, 01/12/2000, 06/12/2000   • INFLUENZA 10/14/2015, 11/18/2016, 10/02/2017   • IPV 2000, 1998, 03/17/1999, 04/17/2000   • Influenza Split 11/19/2008, 11/02/2009   • Influenza, seasonal, injectable 10/14/2015   • MMR 01/12/2000, 10/08/2002   • Meningococcal Conjugate (MCV4O) 08/03/2017   • Meningococcal MCV4P 08/03/2017   • Meningococcal Polysaccharide (MPSV4) 08/12/2011   • Pneumococcal Conjugate 13-Valent 11/07/2000   • TD (adult) Preservative Free 08/12/2011   • Varicella 10/06/1999, 08/12/2011       Objective     /80 (BP Location: Left arm, Patient Position: Sitting, Cuff Size: Large)   Pulse 100   Temp 98.1 °F (36.7 °C)   Resp 20   Ht 5' 8\" (1.727 m)   Wt 120 kg (263 lb 8 oz)   SpO2 97%   BMI 40.07 kg/m²     Physical Exam  Vitals and nursing note reviewed.   Constitutional:       Appearance: She is well-developed.   HENT:      Head: Normocephalic and atraumatic.      Nose: Nose normal.      Mouth/Throat:      Mouth: Mucous membranes are moist.   Eyes:      General: No scleral icterus.     Conjunctiva/sclera: Conjunctivae normal.      Pupils: Pupils are equal, round, and reactive to light.   Neck:      Thyroid: No thyromegaly.   Cardiovascular:      Rate and Rhythm: Normal rate and regular rhythm.   Pulmonary:      Effort: Pulmonary effort is normal.      Breath sounds: Normal breath sounds. No wheezing.   Abdominal:      General: Bowel sounds are normal. There is no distension.      Palpations: Abdomen is soft.      Tenderness: There is no abdominal tenderness. There is no guarding or rebound.   Musculoskeletal:         General: No " tenderness or deformity. Normal range of motion.      Cervical back: Normal range of motion and neck supple.   Skin:     General: Skin is warm and dry.      Findings: No erythema or rash.   Neurological:      Mental Status: She is alert and oriented to person, place, and time.      Sensory: No sensory deficit.   Psychiatric:         Mood and Affect: Mood normal.         Behavior: Behavior normal.         Thought Content: Thought content normal.         Judgment: Judgment normal.       David Coy MD

## 2024-05-23 ENCOUNTER — VBI (OUTPATIENT)
Dept: ADMINISTRATIVE | Facility: OTHER | Age: 24
End: 2024-05-23

## 2024-06-11 ENCOUNTER — TELEPHONE (OUTPATIENT)
Age: 24
End: 2024-06-11

## 2024-06-11 DIAGNOSIS — G43.909 MIGRAINE WITHOUT STATUS MIGRAINOSUS, NOT INTRACTABLE, UNSPECIFIED MIGRAINE TYPE: ICD-10-CM

## 2024-06-11 NOTE — TELEPHONE ENCOUNTER
Patient called requesting refill for   rizatriptan (MAXALT) 10 mg tablet  Patient made aware medication was refilled on 5/8/24 for 10 with 5 refills to [facility] pharmacy. Patient instructed to contact the pharmacy to obtain refills of medication. Patient verbalized understanding.

## 2024-06-12 RX ORDER — RIZATRIPTAN BENZOATE 10 MG/1
10 TABLET ORAL ONCE AS NEEDED
Qty: 10 TABLET | Refills: 0 | Status: SHIPPED | OUTPATIENT
Start: 2024-06-12

## 2024-08-08 DIAGNOSIS — Z00.6 ENCOUNTER FOR EXAMINATION FOR NORMAL COMPARISON OR CONTROL IN CLINICAL RESEARCH PROGRAM: ICD-10-CM

## 2024-09-28 DIAGNOSIS — G43.909 MIGRAINE WITHOUT STATUS MIGRAINOSUS, NOT INTRACTABLE, UNSPECIFIED MIGRAINE TYPE: ICD-10-CM

## 2024-09-28 DIAGNOSIS — F32.A DEPRESSION, UNSPECIFIED DEPRESSION TYPE: ICD-10-CM

## 2024-09-29 RX ORDER — TOPIRAMATE 100 MG/1
100 TABLET, FILM COATED ORAL DAILY
Qty: 30 TABLET | Refills: 5 | Status: SHIPPED | OUTPATIENT
Start: 2024-09-29

## 2024-09-29 RX ORDER — VENLAFAXINE HYDROCHLORIDE 150 MG/1
150 CAPSULE, EXTENDED RELEASE ORAL DAILY
Qty: 30 CAPSULE | Refills: 5 | Status: SHIPPED | OUTPATIENT
Start: 2024-09-29

## 2024-09-29 RX ORDER — SERTRALINE HYDROCHLORIDE 25 MG/1
25 TABLET, FILM COATED ORAL DAILY
Qty: 30 TABLET | Refills: 5 | Status: SHIPPED | OUTPATIENT
Start: 2024-09-29

## 2024-10-17 ENCOUNTER — VBI (OUTPATIENT)
Dept: ADMINISTRATIVE | Facility: OTHER | Age: 24
End: 2024-10-17

## 2024-10-17 NOTE — TELEPHONE ENCOUNTER
10/17/24 9:26 AM     Chart reviewed for Cervical Cancer Screening was/were not submitted to the patient's insurance.     Palak Hidalgo MA   [facility]

## 2024-12-14 ENCOUNTER — VBI (OUTPATIENT)
Dept: ADMINISTRATIVE | Facility: OTHER | Age: 24
End: 2024-12-14

## 2024-12-14 NOTE — TELEPHONE ENCOUNTER
12/14/24 12:12 PM     Chart reviewed for Cervical Cancer Screening was/were not submitted to the patient's insurance.     Palak Hidalgo MA   [facility]

## 2025-04-13 DIAGNOSIS — F32.A DEPRESSION, UNSPECIFIED DEPRESSION TYPE: ICD-10-CM

## 2025-04-15 RX ORDER — SERTRALINE HYDROCHLORIDE 25 MG/1
25 TABLET, FILM COATED ORAL DAILY
Qty: 30 TABLET | Refills: 5 | Status: SHIPPED | OUTPATIENT
Start: 2025-04-15

## 2025-04-18 ENCOUNTER — VBI (OUTPATIENT)
Dept: ADMINISTRATIVE | Facility: OTHER | Age: 25
End: 2025-04-18

## 2025-04-18 NOTE — TELEPHONE ENCOUNTER
04/18/25 11:01 AM     Chart reviewed for Pap Smear (HPV) aka Cervical Cancer Screening was/were not submitted to the patient's insurance.     Palak Hidalgo MA   [facility]

## 2025-05-11 DIAGNOSIS — G43.909 MIGRAINE WITHOUT STATUS MIGRAINOSUS, NOT INTRACTABLE, UNSPECIFIED MIGRAINE TYPE: ICD-10-CM

## 2025-05-12 RX ORDER — TOPIRAMATE 100 MG/1
100 TABLET, FILM COATED ORAL DAILY
Qty: 30 TABLET | Refills: 0 | Status: SHIPPED | OUTPATIENT
Start: 2025-05-12

## 2025-06-16 DIAGNOSIS — G43.909 MIGRAINE WITHOUT STATUS MIGRAINOSUS, NOT INTRACTABLE, UNSPECIFIED MIGRAINE TYPE: ICD-10-CM

## 2025-06-16 RX ORDER — TOPIRAMATE 100 MG/1
100 TABLET, FILM COATED ORAL DAILY
Qty: 30 TABLET | Refills: 0 | OUTPATIENT
Start: 2025-06-16

## 2025-06-17 NOTE — TELEPHONE ENCOUNTER
Spoke with pt and she stated that she no longer lives in this area. She has moved to [place] and has a PCP there.    The procedure was performed independently The procedure was performed independently